# Patient Record
Sex: MALE | Race: WHITE | NOT HISPANIC OR LATINO | Employment: OTHER | URBAN - METROPOLITAN AREA
[De-identification: names, ages, dates, MRNs, and addresses within clinical notes are randomized per-mention and may not be internally consistent; named-entity substitution may affect disease eponyms.]

---

## 2020-08-11 RX ORDER — CALCIUM CARBONATE/VITAMIN D3 500-10/5ML
30 LIQUID (ML) ORAL
COMMUNITY

## 2020-08-11 RX ORDER — DIMENHYDRINATE 50 MG
TABLET ORAL DAILY
COMMUNITY

## 2020-08-11 RX ORDER — OMEPRAZOLE 20 MG/1
20 CAPSULE, DELAYED RELEASE ORAL DAILY
COMMUNITY

## 2020-08-11 RX ORDER — ATORVASTATIN CALCIUM 10 MG/1
10 TABLET, FILM COATED ORAL NIGHTLY
COMMUNITY

## 2020-08-11 NOTE — OR NURSING
"Preadmit appointment: \" Preparing for your Procedure information\" sheet given to patient with verbal and written instructions. Patient instructed to continue prescribed medications through the day before surgery, instructed to take the following medications the day of surgery per anesthesia protocol: omeprazole, atorvastatin.     Patient was advised to check with MD regarding Plan to stop and restart ASA.     Patient coming in to [facility] for testing on 8/13/20. Needs STAT Covid and EKG.  "

## 2020-08-13 ENCOUNTER — ANESTHESIA EVENT (OUTPATIENT)
Dept: SURGERY | Facility: MEDICAL CENTER | Age: 70
End: 2020-08-13
Payer: COMMERCIAL

## 2020-08-13 DIAGNOSIS — Z01.812 PRE-OPERATIVE LABORATORY EXAMINATION: ICD-10-CM

## 2020-08-13 DIAGNOSIS — Z01.810 PRE-OPERATIVE CARDIOVASCULAR EXAMINATION: ICD-10-CM

## 2020-08-13 LAB
COVID ORDER STATUS COVID19: NORMAL
EKG IMPRESSION: NORMAL
SARS-COV-2 RDRP RESP QL NAA+PROBE: NOTDETECTED
SPECIMEN SOURCE: NORMAL

## 2020-08-13 PROCEDURE — 93010 ELECTROCARDIOGRAM REPORT: CPT | Performed by: INTERNAL MEDICINE

## 2020-08-13 PROCEDURE — 93005 ELECTROCARDIOGRAM TRACING: CPT

## 2020-08-13 PROCEDURE — U0004 COV-19 TEST NON-CDC HGH THRU: HCPCS

## 2020-08-14 ENCOUNTER — APPOINTMENT (OUTPATIENT)
Dept: RADIOLOGY | Facility: MEDICAL CENTER | Age: 70
End: 2020-08-14
Attending: INTERNAL MEDICINE
Payer: COMMERCIAL

## 2020-08-14 ENCOUNTER — HOSPITAL ENCOUNTER (OUTPATIENT)
Facility: MEDICAL CENTER | Age: 70
End: 2020-08-14
Attending: INTERNAL MEDICINE | Admitting: INTERNAL MEDICINE
Payer: COMMERCIAL

## 2020-08-14 ENCOUNTER — ANESTHESIA (OUTPATIENT)
Dept: SURGERY | Facility: MEDICAL CENTER | Age: 70
End: 2020-08-14
Payer: COMMERCIAL

## 2020-08-14 VITALS
OXYGEN SATURATION: 94 % | TEMPERATURE: 97.3 F | RESPIRATION RATE: 16 BRPM | HEART RATE: 71 BPM | WEIGHT: 192.46 LBS | DIASTOLIC BLOOD PRESSURE: 85 MMHG | SYSTOLIC BLOOD PRESSURE: 132 MMHG

## 2020-08-14 LAB — PATHOLOGY CONSULT NOTE: NORMAL

## 2020-08-14 PROCEDURE — A9270 NON-COVERED ITEM OR SERVICE: HCPCS | Performed by: INTERNAL MEDICINE

## 2020-08-14 PROCEDURE — 160203 HCHG ENDO MINUTES - 1ST 30 MINS LEVEL 4: Performed by: INTERNAL MEDICINE

## 2020-08-14 PROCEDURE — 74330 X-RAY BILE/PANC ENDOSCOPY: CPT

## 2020-08-14 PROCEDURE — 700101 HCHG RX REV CODE 250: Performed by: INTERNAL MEDICINE

## 2020-08-14 PROCEDURE — 160035 HCHG PACU - 1ST 60 MINS PHASE I: Performed by: INTERNAL MEDICINE

## 2020-08-14 PROCEDURE — 500066 HCHG BITE BLOCK, ECT: Performed by: INTERNAL MEDICINE

## 2020-08-14 PROCEDURE — 160025 RECOVERY II MINUTES (STATS): Performed by: INTERNAL MEDICINE

## 2020-08-14 PROCEDURE — 502240 HCHG MISC OR SUPPLY RC 0272: Performed by: INTERNAL MEDICINE

## 2020-08-14 PROCEDURE — 700102 HCHG RX REV CODE 250 W/ 637 OVERRIDE(OP): Performed by: INTERNAL MEDICINE

## 2020-08-14 PROCEDURE — 88305 TISSUE EXAM BY PATHOLOGIST: CPT

## 2020-08-14 PROCEDURE — 700101 HCHG RX REV CODE 250: Performed by: ANESTHESIOLOGY

## 2020-08-14 PROCEDURE — 700111 HCHG RX REV CODE 636 W/ 250 OVERRIDE (IP): Performed by: ANESTHESIOLOGY

## 2020-08-14 PROCEDURE — 110371 HCHG SHELL REV 272: Performed by: INTERNAL MEDICINE

## 2020-08-14 PROCEDURE — 700105 HCHG RX REV CODE 258: Performed by: INTERNAL MEDICINE

## 2020-08-14 PROCEDURE — A9270 NON-COVERED ITEM OR SERVICE: HCPCS

## 2020-08-14 PROCEDURE — 160048 HCHG OR STATISTICAL LEVEL 1-5: Performed by: INTERNAL MEDICINE

## 2020-08-14 PROCEDURE — 160002 HCHG RECOVERY MINUTES (STAT): Performed by: INTERNAL MEDICINE

## 2020-08-14 PROCEDURE — 700102 HCHG RX REV CODE 250 W/ 637 OVERRIDE(OP)

## 2020-08-14 PROCEDURE — 160208 HCHG ENDO MINUTES - EA ADDL 1 MIN LEVEL 4: Performed by: INTERNAL MEDICINE

## 2020-08-14 PROCEDURE — 160046 HCHG PACU - 1ST 60 MINS PHASE II: Performed by: INTERNAL MEDICINE

## 2020-08-14 PROCEDURE — 160009 HCHG ANES TIME/MIN: Performed by: INTERNAL MEDICINE

## 2020-08-14 RX ORDER — INDOMETHACIN 50 MG/1
SUPPOSITORY RECTAL
Status: COMPLETED
Start: 2020-08-14 | End: 2020-08-14

## 2020-08-14 RX ORDER — HALOPERIDOL 5 MG/ML
1 INJECTION INTRAMUSCULAR
Status: DISCONTINUED | OUTPATIENT
Start: 2020-08-14 | End: 2020-08-14 | Stop reason: HOSPADM

## 2020-08-14 RX ORDER — INDOMETHACIN 50 MG/1
100 SUPPOSITORY RECTAL ONCE
Status: COMPLETED | OUTPATIENT
Start: 2020-08-14 | End: 2020-08-14

## 2020-08-14 RX ORDER — DIPHENHYDRAMINE HYDROCHLORIDE 50 MG/ML
12.5 INJECTION INTRAMUSCULAR; INTRAVENOUS
Status: DISCONTINUED | OUTPATIENT
Start: 2020-08-14 | End: 2020-08-14 | Stop reason: HOSPADM

## 2020-08-14 RX ORDER — LIDOCAINE HYDROCHLORIDE 20 MG/ML
INJECTION, SOLUTION EPIDURAL; INFILTRATION; INTRACAUDAL; PERINEURAL PRN
Status: DISCONTINUED | OUTPATIENT
Start: 2020-08-14 | End: 2020-08-14 | Stop reason: SURG

## 2020-08-14 RX ORDER — ONDANSETRON 2 MG/ML
4 INJECTION INTRAMUSCULAR; INTRAVENOUS
Status: DISCONTINUED | OUTPATIENT
Start: 2020-08-14 | End: 2020-08-14 | Stop reason: HOSPADM

## 2020-08-14 RX ORDER — SODIUM CHLORIDE, SODIUM LACTATE, POTASSIUM CHLORIDE, CALCIUM CHLORIDE 600; 310; 30; 20 MG/100ML; MG/100ML; MG/100ML; MG/100ML
INJECTION, SOLUTION INTRAVENOUS CONTINUOUS
Status: DISCONTINUED | OUTPATIENT
Start: 2020-08-14 | End: 2020-08-14 | Stop reason: HOSPADM

## 2020-08-14 RX ORDER — SUCCINYLCHOLINE/SOD CL,ISO/PF 200MG/10ML
SYRINGE (ML) INTRAVENOUS PRN
Status: DISCONTINUED | OUTPATIENT
Start: 2020-08-14 | End: 2020-08-14 | Stop reason: SURG

## 2020-08-14 RX ORDER — HYDRALAZINE HYDROCHLORIDE 20 MG/ML
5 INJECTION INTRAMUSCULAR; INTRAVENOUS
Status: DISCONTINUED | OUTPATIENT
Start: 2020-08-14 | End: 2020-08-14 | Stop reason: HOSPADM

## 2020-08-14 RX ORDER — ROCURONIUM BROMIDE 10 MG/ML
INJECTION, SOLUTION INTRAVENOUS PRN
Status: DISCONTINUED | OUTPATIENT
Start: 2020-08-14 | End: 2020-08-14 | Stop reason: SURG

## 2020-08-14 RX ADMIN — PROPOFOL 200 MG: 10 INJECTION, EMULSION INTRAVENOUS at 08:01

## 2020-08-14 RX ADMIN — INDOMETHACIN 100 MG: 50 SUPPOSITORY RECTAL at 09:13

## 2020-08-14 RX ADMIN — FENTANYL CITRATE 50 MCG: 50 INJECTION, SOLUTION INTRAMUSCULAR; INTRAVENOUS at 07:59

## 2020-08-14 RX ADMIN — SODIUM CHLORIDE, POTASSIUM CHLORIDE, SODIUM LACTATE AND CALCIUM CHLORIDE: 600; 310; 30; 20 INJECTION, SOLUTION INTRAVENOUS at 07:13

## 2020-08-14 RX ADMIN — LIDOCAINE HYDROCHLORIDE 0.5 ML: 10 INJECTION, SOLUTION INFILTRATION; PERINEURAL at 07:12

## 2020-08-14 RX ADMIN — Medication 100 MG: at 08:02

## 2020-08-14 RX ADMIN — LIDOCAINE HYDROCHLORIDE 100 MG: 20 INJECTION, SOLUTION EPIDURAL; INFILTRATION; INTRACAUDAL; PERINEURAL at 08:01

## 2020-08-14 RX ADMIN — ROCURONIUM BROMIDE 10 MG: 10 INJECTION, SOLUTION INTRAVENOUS at 08:01

## 2020-08-14 RX ADMIN — POVIDONE-IODINE 15 ML: 10 SOLUTION TOPICAL at 07:14

## 2020-08-14 ASSESSMENT — PAIN SCALES - GENERAL: PAIN_LEVEL: 0

## 2020-08-14 NOTE — ANESTHESIA PROCEDURE NOTES
Airway    Date/Time: 8/14/2020 8:03 AM  Performed by: Efrain Omer M.D.  Authorized by: Efrain Omer M.D.     Location:  OR  Urgency:  Elective  Difficult Airway: No    Indications for Airway Management:  Anesthesia and airway protection      Spontaneous Ventilation: absent    Sedation Level:  Deep  Preoxygenated: Yes    Patient Position:  Sniffing  MILS Maintained Throughout: No    Mask Difficulty Assessment:  0 - not attempted  Final Airway Type:  Endotracheal airway  Final Endotracheal Airway:  ETT  Cuffed: Yes    Technique Used for Successful ETT Placement:  Video laryngoscopy    Insertion Site:  Oral  Blade Type:  Glide  Laryngoscope Blade/Videolaryngoscope Blade Size:  4  ETT Size (mm):  7.5  Measured from:  Teeth  ETT to Teeth (cm):  23  Placement Verified by: auscultation and capnometry    Cormack-Lehane Classification:  Grade I - full view of glottis  Number of Attempts at Approach:  1  Number of Other Approaches Attempted:  0

## 2020-08-14 NOTE — ANESTHESIA PREPROCEDURE EVALUATION
"Anes H&P:  PAST MEDICAL HISTORY:   69 y.o. male who presents for Procedure(s):  GASTROSCOPY  EGD, WITH ENDOSCOPIC US - UPPER LINEAR  ERCP (ENDOSCOPIC RETROGRADE CHOLANGIOPANCREATOGRAPHY) - WITH SPHINCTEROTOMY WITH STONE REMOVAL POSSIBLE STENT PLACEMENT.  He has current and past medical problems significant for:    #Gallstone, no nausea or vomiting    Patient denies history of seizures or strokes  Patient denies history of diabetes or thyroid disease  Patient denies history of esophageal disease  Patient denies history of ALEXA  Patient denies history of previous MI, chest pain or shortness of breath  Patient denies history of renal failure  Patient denies history of upper or lower extremity motor/sensory deficits   Patient denies history of bleeding disorders    Able to climb 2 flights of stair without dyspnea or angina, > 4 METs     Past Medical History:   Diagnosis Date   • Anesthesia     combative under anesthesia \"I was swinging\"    • Arrhythmia     history of - asymptomatic   • Cataract     iol bilateral   • Heart burn    • High cholesterol    • Urinary incontinence        SMOKING/ALCOHOL/RECREATIONAL DRUG USE:  Social History     Tobacco Use   • Smoking status: Never Smoker   • Smokeless tobacco: Never Used   Substance Use Topics   • Alcohol use: Yes     Comment: 6-8/day   • Drug use: Never     Social History     Substance and Sexual Activity   Drug Use Never       PAST SURGICAL HISTORY:  Past Surgical History:   Procedure Laterality Date   • CATARACT EXTRACTION WITH IOL  2019   • SHOULDER ARTHROSCOPY W/ ROTATOR CUFF REPAIR Left 11/2018       ALLERGIES:   No Known Allergies    MEDICATIONS:  No current facility-administered medications on file prior to encounter.      No current outpatient medications on file prior to encounter.       LABS:  No results found for: HEMOGLOBIN, HEMATOCRIT, WBC  No results found for: SODIUM, POTASSIUM, CHLORIDE, CO2, GLUCOSE, BUN, CALCIUM    SARS-CoV2 result: Negative    EKG 8/13/20 " NSR, LAFB    PREVIOUS ANESTHETICS: See EMR  __________________________________________      Relevant Problems   No relevant active problems       Physical Exam    Airway   Mallampati: III  TM distance: >3 FB  Neck ROM: full       Cardiovascular - normal exam  Rhythm: regular  Rate: normal  (-) murmur     Dental - normal exam           Pulmonary - normal exam  Breath sounds clear to auscultation     Abdominal    Neurological - normal exam                 Anesthesia Plan    ASA 2       Plan - general       Airway plan will be ETT      Plan Factors:   Patient was not previously instructed to abstain from smoking on day of procedure.  Patient did not smoke on day of procedure.      Induction: intravenous and rapid sequence    Postoperative Plan: Postoperative administration of opioids is intended.    Pertinent diagnostic labs and testing reviewed    Informed Consent:    Anesthetic plan and risks discussed with patient.    Use of blood products discussed with: patient whom consented to blood products.

## 2020-08-14 NOTE — ANESTHESIA QCDR
2019 [facility] Clinical Data Registry (for Quality Improvement)     Postoperative nausea/vomiting risk protocol (Adult = 18 yrs and Pediatric 3-17 yrs)- (430 and 463)  General inhalation anesthetic (NOT TIVA) with PONV risk factors: No  Provision of anti-emetic therapy with at least 2 different classes of agents: N/A  Patient DID NOT receive anti-emetic therapy and reason is documented in Medical Record: N/A    Multimodal Pain Management- (477)  Non-emergent surgery AND patient age >= 18: No  Use of Multimodal Pain Management, two or more drugs and/or interventions, NOT including systemic opioids:   Exception: Documented allergy to multiple classes of analgesics:     Smoking Abstinence (404)  Patient is current smoker (cigarette, pipe, e-cig, marijuanna): No  Elective Surgery:   Abstinence instructions provided prior to day of surgery:   Patient abstained from smoking on day of surgery:     Pre-Op Beta-Blocker in Isolated CABG (44)  Isolated CABG AND patient age >= 18: No  Beta-blocker admin within 24 hours of surgical incision:   Exception:of medical reason(s) for not administering beta blocker within 24 hours prior to surgical incision (e.g., not  indicated,other medical reason):     PACU assessment of acute postoperative pain prior to Anesthesia Care End- Applies to Patients Age = 18- (ABG7)  Initial PACU pain score is which of the following: < 7/10  Patient unable to report pain score: N/A    Post-anesthetic transfer of care checklist/protocol to PACU/ICU- (426 and 427)  Upon conclusion of case, patient transferred to which of the following locations: PACU/Non-ICU  Use of transfer checklist/protocol: Yes  Exclusion: Service Performed in Patient Hospital Room (and thus did not require transfer): N/A  Unplanned admission to ICU related to anesthesia service up through end of PACU care- (MD51)  Unplanned admission to ICU (not initially anticipated at anesthesia start time): No

## 2020-08-14 NOTE — OR NURSING
0900 Pt arrived from OR post   GASTROSCOPY     EGD, WITH ENDOSCOPIC US - UPPER LINEAR     ERCP (ENDOSCOPIC RETROGRADE CHOLANGIOPANCREATOGRAPHY) - WITH SPHINCTEROTOMY WITH STONE REMOVAL POSSIBLE STENT PLACEMENT(((not done)))     , report received. Pt breathing unlabored with clear lung sounds bilat.    0933 Pt states pain is controled and tolerable, denies nausea. Pt tolerating PO fluids. Pt to phase II

## 2020-08-14 NOTE — ANESTHESIA POSTPROCEDURE EVALUATION
Patient: Tommy Carr    Procedure Summary     Date: 08/14/20 Room / Location:  ENDOSCOPIC ULTRASOUND ROOM / SURGERY [facility]    Anesthesia Start: 0758 Anesthesia Stop: 0905    Procedures:       GASTROSCOPY (Abdomen)      EGD, WITH ENDOSCOPIC US - UPPER LINEAR      ERCP (ENDOSCOPIC RETROGRADE CHOLANGIOPANCREATOGRAPHY) - WITH SPHINCTEROTOMY WITH STONE REMOVAL POSSIBLE STENT PLACEMENT(((not done))) Diagnosis: (CHOLEDOCHOLITHIASIS)    Surgeon: Jesus Aponte M.D. Responsible Provider: Efrain Omer M.D.    Anesthesia Type: general ASA Status: 2          Final Anesthesia Type: general  Last vitals  BP   Blood Pressure : 132/85    Temp   36.3 °C (97.3 °F)    Pulse   Pulse: 71   Resp   16    SpO2   94 %      Anesthesia Post Evaluation    Patient location during evaluation: PACU  Patient participation: complete - patient participated  Level of consciousness: awake and alert  Pain score: 0    Airway patency: patent  Anesthetic complications: no  Cardiovascular status: hemodynamically stable  Respiratory status: acceptable  Hydration status: euvolemic    PONV: none           Nurse Pain Score: 0 (NPRS)         1-2 cups/cans per day

## 2020-08-14 NOTE — PROGRESS NOTES
Indication: Abdominal pain, gallstones.   Risks, benefits, and alternatives were discussed with consenting person(s). Consenting person(s) were given an opportunity to ask questions and discuss other options. Risks including but not limited to failed or incomplete EGD/EUS/ERCP and possible stent placement, ineffective therapy, pancreatitis (with potential future complications), perforation, infection, bleeding, missed lesion(s), missed diagnosis, cardiac and/or pulmonary event, aspiration, stroke, possible need for surgery, hospitalization possibly prolonged, discomfort, unsuccessful and/or incomplete procedure, possible need for repeat procedures and/or additional testings, damage to adjacent organs and/or vascular structures, medication reaction, disability, death, and other adverse events possibly life-threatening. Discussion was undertaken with Layman's terms. Consenting persons stated understanding and acceptance of these risks, and wished to proceed. Consent was given in clear state of mind. Discussed need for returning for stent removal if placed.

## 2020-08-14 NOTE — OR NURSING
"0933: Pt arrived from PACU/post endo procedure, Rn report, Pt settled into recliner/dressed, States \"feels a little groggy but pretty good,\" Denies pain/nausea  0943: Cont to have no pain/nausea, VSS  0957: IV removed, Dc instructions completed with pt and friend over the phone, Verbalized understanding, Cont to have no pain  "

## 2020-08-14 NOTE — ANESTHESIA TIME REPORT
Anesthesia Start and Stop Event Times     Date Time Event    8/14/2020 0740 Ready for Procedure     0758 Anesthesia Start     0905 Anesthesia Stop        Responsible Staff  08/14/20    Name Role Begin End    Efrain Omer M.D. Anesth 0758 0905    Jimenez Powers M.D. Anesth 0758 0905        Preop Diagnosis (Free Text):  Pre-op Diagnosis     CHOLEDOCHOLITHIASIS        Preop Diagnosis (Codes):    Post op Diagnosis  Choledocholithiasis      Premium Reason  Non-Premium    Comments: EGD, EUS, ERCP, sphincterotomy, balloon sweep

## 2020-08-14 NOTE — DISCHARGE INSTRUCTIONS
ENDOSCOPY HOME CARE INSTRUCTIONS    GASTROSCOPY OR ERCP  1. Don't eat or drink anything for about an hour after the test. You can then resume your regular diet.  2. Don't drive or drink alcohol for 24 hours. The medication you received will make you too drowsy.  3. Don't take any coffee, tea, or aspirin products until after you see your doctor. These can harm the lining of your stomach.  4. If you begin to vomit bloody material, or develop black or bloody stools, call your doctor as soon as possible.  5. If you have any neck, chest, abdominal pain or temp of 100 degrees, call your doctor.    Recommendations:   1.  Advance diet gradually  2.  Surgical consultation for cholecystectomy would possible intraoperative cholangiogram   3.  Would recommend repeat MRCP if change in symptoms as no stones were seen. It possible that these stones have passed in the interim since the images from 6/29/2020    DR. HAYES [phone]    Depression / Suicide Risk    [facility], it is important to learn how to keep safe from harming yourself.    Recognize the warning signs:  · Abrupt changes in personality, positive or negative- including increase in energy   · Giving away possessions  · Change in eating patterns- significant weight changes-  positive or negative  · Change in sleeping patterns- unable to sleep or sleeping all the time   · Unwillingness or inability to communicate  · Depression  · Unusual sadness, discouragement and loneliness  · Talk of wanting to die  · Neglect of personal appearance   · Rebelliousness- reckless behavior  · Withdrawal from people/activities they love  · Confusion- inability to concentrate     If you or a loved one observes any of these behaviors or has concerns about self-harm, here's what you can do:  · Talk about it- your feelings and reasons for harming yourself  · Remove any means that you might use to hurt yourself (examples: pills, rope, extension  cords, firearm)  · Get professional help from the community (Mental Health, Substance Abuse, psychological counseling)  · Do not be alone:Call your Safe Contact- someone whom you trust who will be there for you.  · Call your local CRISIS HOTLINE 284-2731 or 242-189-5352  · Call your local Children's Mobile Crisis Response Team [place] (416) 848-1859 or www.Minglebox  · Call the toll free National Suicide Prevention Hotlines   · National Suicide Prevention Lifeline 081-016-ONUN (7897)  · National Hope Line Network 800-SUICIDE (345-1845)    I acknowledge receipt and understanding of these Home Care Instructions.

## 2020-08-14 NOTE — PROCEDURES
Esophagogastroduodenoscopy (EGD)/ Echoendoscope (EUS)/ Endoscopic Retrograde Cholangiopancreatography (ERCP)  Attending Physician: Jesus Aponte MD    Indications: Abnormal MRCP  Instrument: Olympus Endoscope/ Echoendoscope/ Duodenoscope  Sedation:     Anesthesiologist/Type of Anesthesia:  Anesthesiologist: Efrain Omer M.D.; Jimenez Powers M.D./General    Surgical Staff:  Circulator: Michael Linton R.N.  Endoscopy Technician: Gabriella Fletcher; Jayna Rodriguez  Radiology Technologist: Elsi El    Specimens removed if any:  ID Type Source Tests Collected by Time Destination   A : Biopsy Other Other PATHOLOGY SPECIMEN Jesus Aponte M.D. 8/14/2020  8:16 AM    B : Biopsy Other Other PATHOLOGY SPECIMEN Jesus Aponte M.D. 8/14/2020  8:16 AM          Pre-Anesthesia Assessment:  Prior to the procedure, a History and Physical was performed, and patient medications and allergies were reviewed. The patient’s tolerance of previous anesthesia was also reviewed. The risks and benefits of the procedure and the sedation options and risks were discussed with the patient including but not limited to infection, bleeding, aspiration, perforation, adverse medication reaction, missed diagnosis,  pancreatitis, and missed lesions. The patient verbalized understanding. All questions were answered, and informed consent was obtained.     After I obtained informed consent from the patient, the patient was placed in the swimmer  position. Appropriate time-out protocol was followed: the correct patient, the correct procedure, and the correct equipment in the room were confirmed. Throughout the procedure, the patient’s blood pressure, pulse, and oxygen saturations were monitored continuously. The endoscope/echoendoscope/duodenoscope were inserted through the oropharynx, esophagus intubated, then advanced to the gastrointestinal tract.  Findings and interventions were performed and documented below. Air was then withdrawn and the  endoscopes removed. The patient tolerated the procedure well. There were no immediate postoperative complications.       Findings:    EGD:  Esophagus:Normal. Irregular Z-line at 43cm from incisors, 4 quadrant biopsies.  Stomach: Within the limits of the procedure from in position the stomach overall was without gross mass or ulceration.  There were some fundic appearing polyps that was sampled were all less than 5mm in size.  Duodenum: First and second portion duodenum appeared normal.     EUS:  Endoscopic exam with the side viewing echoendoscope was normal. The major papilla was normal endoscopically and sonographically.    The bile duct was dilated and measured 14 mm in maximal diameter. The gallbladder was abnormal with sludge. No large stones were noted. No obvious stones were seen despite multiple passes. Transmission quality limited, secondary to large duodenal diverticulum. The appeared to be a taper of the bile duct into the duodenum like the intrapancreatic portion.  No obvious mass was seen.    The pancreatic parenchyma appeared abnormal. There were features of chronic pancreatitis although based on [place] criteria (hyperechoic strands, lobularly) does not qualify for chronic pancreatitis). No masses, cysts or stones were visualized in the pancreatic parenchyma.The pancreatic duct measured  3.8mm in the head, 2mm in the body of the pancreas.    No lymph nodes were seen in the upper abdomen and mediastinum.    No masses were seen in the visualized portions of the liver.    The left adrenal appeared normal.    ERCP:   film was normal.  Scope was inserted to the second portion of duodenum without difficulty.  There appears to be a large duodenal diverticulum proximal of the ampulla.  At the location the ampulla there also appears to be a small duodenal diverticulum of which the papilla was at the 7 o'clock position at the edge of the diverticulum.  Angulation with a 0.025 wire sphincterotome was pure.   Wire was introduced into the right intrahepatic duct.  Test cholangiogram demonstrated appropriate anatomy with dilated common bile duct.  Rising initially in 9-12 mm extractor balloon multiple balloon sweeps were performed with only sludge noted.  No obvious stones noted.  This was subsequently changed to a 15-18mm extractor balloon with gradual decrease of balloon on sweep through the ampulla.  Subsequently a stepwise occlusion cholangiogram demonstrated no filling defect of any sorts noted despite sweeps from the intrahepatic portion of the duct (see fluoro view).  Impacted occlusion cholangiogram also demonstrated no extravasation of contrast bilateral films under diaphragm was negative for free air.  Pancreatic was never cannulated or injected throughout entire procedure.          NOTE: Radiologic interpretation of dynamic and static fluoroscopic imaging by myself.  At no time was/were a Radiologist present.       Impressions:   1. Dilated common bile duct and intraheptic duct without obvious stones noted, treated with ERCP, biliary sphincterotomy, balloon sweep.   2. Possible dilation of the bile duct secondary to anatomy of papilla adjacent to small diverticulum and courses through large duodenal diverticulum  3. Gastric polyp, likely fundic gland in nature, sample biopsied.  4. Irregular Z-line at 43cm, 4 quadrant biopsies to rule out Calabrese's esophagus.     Recommendations:   1.  Monitor for postprocedure complication including perforation bleeding pancreatitis  2.  Advance diet gradually  3.  Surgical consultation for cholecystectomy would possible intraoperative cholangiogram   4.  Would recommend repeat MRCP if change in symptoms as no stones were seen. It possible that these stones have passed in the interim since the images from 6/29/2020        This note was generated using voice recognition software which has a small chance of producing errors of grammar and possibly content. I have made every  reasonable attempt to find and correct any obvious errors, but expect that some may not be found prior to finalization of this note

## 2020-08-14 NOTE — OR NURSING
Patient to preop, allergies and NPO status verified, home medications reconciled, belongings secured, verbalizes understanding of pain scale, surgical site verified, IV access established, SCDs in place, nasal and oral of triple aim completed.

## 2025-07-18 ENCOUNTER — HOSPITAL ENCOUNTER (INPATIENT)
Facility: MEDICAL CENTER | Age: 75
LOS: 3 days | DRG: 322 | End: 2025-07-21
Attending: STUDENT IN AN ORGANIZED HEALTH CARE EDUCATION/TRAINING PROGRAM | Admitting: STUDENT IN AN ORGANIZED HEALTH CARE EDUCATION/TRAINING PROGRAM
Payer: COMMERCIAL

## 2025-07-18 ENCOUNTER — HOSPITAL ENCOUNTER (OUTPATIENT)
Dept: RADIOLOGY | Facility: MEDICAL CENTER | Age: 75
End: 2025-07-18

## 2025-07-18 DIAGNOSIS — I50.20 HFREF (HEART FAILURE WITH REDUCED EJECTION FRACTION) (HCC): ICD-10-CM

## 2025-07-18 DIAGNOSIS — I21.4 NSTEMI (NON-ST ELEVATED MYOCARDIAL INFARCTION) (HCC): Primary | ICD-10-CM

## 2025-07-18 PROBLEM — E78.49 OTHER HYPERLIPIDEMIA: Status: ACTIVE | Noted: 2025-07-18

## 2025-07-18 PROBLEM — K21.9 GERD (GASTROESOPHAGEAL REFLUX DISEASE): Status: ACTIVE | Noted: 2025-07-18

## 2025-07-18 LAB
ALBUMIN SERPL BCP-MCNC: 4.2 G/DL (ref 3.2–4.9)
ALBUMIN/GLOB SERPL: 1.8 G/DL
ALP SERPL-CCNC: 57 U/L (ref 30–99)
ALT SERPL-CCNC: 15 U/L (ref 2–50)
ANION GAP SERPL CALC-SCNC: 10 MMOL/L (ref 7–16)
APTT PPP: 27.9 SEC (ref 24.7–36)
AST SERPL-CCNC: 22 U/L (ref 12–45)
BILIRUB SERPL-MCNC: 0.7 MG/DL (ref 0.1–1.5)
BUN SERPL-MCNC: 11 MG/DL (ref 8–22)
CALCIUM ALBUM COR SERPL-MCNC: 8.7 MG/DL (ref 8.5–10.5)
CALCIUM SERPL-MCNC: 8.9 MG/DL (ref 8.5–10.5)
CHLORIDE SERPL-SCNC: 104 MMOL/L (ref 96–112)
CO2 SERPL-SCNC: 23 MMOL/L (ref 20–33)
CREAT SERPL-MCNC: 0.79 MG/DL (ref 0.5–1.4)
EKG IMPRESSION: NORMAL
ERYTHROCYTE [DISTWIDTH] IN BLOOD BY AUTOMATED COUNT: 44.3 FL (ref 35.9–50)
GFR SERPLBLD CREATININE-BSD FMLA CKD-EPI: 92 ML/MIN/1.73 M 2
GLOBULIN SER CALC-MCNC: 2.3 G/DL (ref 1.9–3.5)
GLUCOSE SERPL-MCNC: 137 MG/DL (ref 65–99)
HCT VFR BLD AUTO: 40.1 % (ref 42–52)
HGB BLD-MCNC: 13.6 G/DL (ref 14–18)
INR PPP: 1.03 (ref 0.87–1.13)
MAGNESIUM SERPL-MCNC: 2.3 MG/DL (ref 1.5–2.5)
MCH RBC QN AUTO: 31.3 PG (ref 27–33)
MCHC RBC AUTO-ENTMCNC: 33.9 G/DL (ref 32.3–36.5)
MCV RBC AUTO: 92.2 FL (ref 81.4–97.8)
PLATELET # BLD AUTO: 201 K/UL (ref 164–446)
PMV BLD AUTO: 8.8 FL (ref 9–12.9)
POTASSIUM SERPL-SCNC: 4.5 MMOL/L (ref 3.6–5.5)
PROT SERPL-MCNC: 6.5 G/DL (ref 6–8.2)
PROTHROMBIN TIME: 13.5 SEC (ref 12–14.6)
RBC # BLD AUTO: 4.35 M/UL (ref 4.7–6.1)
SODIUM SERPL-SCNC: 137 MMOL/L (ref 135–145)
TROPONIN T SERPL-MCNC: 41 NG/L (ref 6–19)
TROPONIN T SERPL-MCNC: 42 NG/L (ref 6–19)
UFH PPP CHRO-ACNC: 0.51 IU/ML
UFH PPP CHRO-ACNC: <0.1 IU/ML
WBC # BLD AUTO: 8.6 K/UL (ref 4.8–10.8)

## 2025-07-18 PROCEDURE — 85027 COMPLETE CBC AUTOMATED: CPT

## 2025-07-18 PROCEDURE — 700102 HCHG RX REV CODE 250 W/ 637 OVERRIDE(OP): Performed by: STUDENT IN AN ORGANIZED HEALTH CARE EDUCATION/TRAINING PROGRAM

## 2025-07-18 PROCEDURE — 85610 PROTHROMBIN TIME: CPT

## 2025-07-18 PROCEDURE — 700102 HCHG RX REV CODE 250 W/ 637 OVERRIDE(OP): Performed by: NURSE PRACTITIONER

## 2025-07-18 PROCEDURE — 85730 THROMBOPLASTIN TIME PARTIAL: CPT

## 2025-07-18 PROCEDURE — 80053 COMPREHEN METABOLIC PANEL: CPT

## 2025-07-18 PROCEDURE — A9270 NON-COVERED ITEM OR SERVICE: HCPCS | Performed by: NURSE PRACTITIONER

## 2025-07-18 PROCEDURE — 93005 ELECTROCARDIOGRAM TRACING: CPT | Mod: TC | Performed by: HOSPITALIST

## 2025-07-18 PROCEDURE — 770020 HCHG ROOM/CARE - TELE (206)

## 2025-07-18 PROCEDURE — 93010 ELECTROCARDIOGRAM REPORT: CPT | Performed by: STUDENT IN AN ORGANIZED HEALTH CARE EDUCATION/TRAINING PROGRAM

## 2025-07-18 PROCEDURE — 99291 CRITICAL CARE FIRST HOUR: CPT | Performed by: STUDENT IN AN ORGANIZED HEALTH CARE EDUCATION/TRAINING PROGRAM

## 2025-07-18 PROCEDURE — 85520 HEPARIN ASSAY: CPT | Mod: 91

## 2025-07-18 PROCEDURE — 36415 COLL VENOUS BLD VENIPUNCTURE: CPT

## 2025-07-18 PROCEDURE — 99222 1ST HOSP IP/OBS MODERATE 55: CPT | Performed by: INTERNAL MEDICINE

## 2025-07-18 PROCEDURE — 700111 HCHG RX REV CODE 636 W/ 250 OVERRIDE (IP): Performed by: STUDENT IN AN ORGANIZED HEALTH CARE EDUCATION/TRAINING PROGRAM

## 2025-07-18 PROCEDURE — 83735 ASSAY OF MAGNESIUM: CPT

## 2025-07-18 PROCEDURE — A9270 NON-COVERED ITEM OR SERVICE: HCPCS | Performed by: STUDENT IN AN ORGANIZED HEALTH CARE EDUCATION/TRAINING PROGRAM

## 2025-07-18 PROCEDURE — 84484 ASSAY OF TROPONIN QUANT: CPT

## 2025-07-18 RX ORDER — ATORVASTATIN CALCIUM 10 MG/1
10 TABLET, FILM COATED ORAL EVERY EVENING
Status: DISCONTINUED | OUTPATIENT
Start: 2025-07-18 | End: 2025-07-20

## 2025-07-18 RX ORDER — LABETALOL HYDROCHLORIDE 5 MG/ML
10 INJECTION, SOLUTION INTRAVENOUS EVERY 4 HOURS PRN
Status: DISCONTINUED | OUTPATIENT
Start: 2025-07-18 | End: 2025-07-21 | Stop reason: HOSPADM

## 2025-07-18 RX ORDER — HEPARIN SODIUM 5000 [USP'U]/100ML
0-30 INJECTION, SOLUTION INTRAVENOUS CONTINUOUS
Status: DISCONTINUED | OUTPATIENT
Start: 2025-07-18 | End: 2025-07-19

## 2025-07-18 RX ORDER — HEPARIN SODIUM 1000 [USP'U]/ML
4000 INJECTION, SOLUTION INTRAVENOUS; SUBCUTANEOUS ONCE
Status: COMPLETED | OUTPATIENT
Start: 2025-07-18 | End: 2025-07-18

## 2025-07-18 RX ORDER — CALCIUM CARBONATE 500 MG/1
500 TABLET, CHEWABLE ORAL 3 TIMES DAILY PRN
Status: DISCONTINUED | OUTPATIENT
Start: 2025-07-18 | End: 2025-07-21 | Stop reason: HOSPADM

## 2025-07-18 RX ORDER — HEPARIN SODIUM 1000 [USP'U]/ML
2000 INJECTION, SOLUTION INTRAVENOUS; SUBCUTANEOUS PRN
Status: DISCONTINUED | OUTPATIENT
Start: 2025-07-18 | End: 2025-07-19

## 2025-07-18 RX ORDER — ACETAMINOPHEN 325 MG/1
650 TABLET ORAL EVERY 6 HOURS PRN
Status: DISCONTINUED | OUTPATIENT
Start: 2025-07-18 | End: 2025-07-21 | Stop reason: HOSPADM

## 2025-07-18 RX ADMIN — HEPARIN SODIUM 4000 UNITS: 1000 INJECTION, SOLUTION INTRAVENOUS; SUBCUTANEOUS at 12:00

## 2025-07-18 RX ADMIN — HEPARIN SODIUM 12 UNITS/KG/HR: 5000 INJECTION, SOLUTION INTRAVENOUS at 12:00

## 2025-07-18 RX ADMIN — ATORVASTATIN CALCIUM 10 MG: 10 TABLET, FILM COATED ORAL at 18:20

## 2025-07-18 RX ADMIN — ANTACID TABLETS 500 MG: 500 TABLET, CHEWABLE ORAL at 20:43

## 2025-07-18 SDOH — ECONOMIC STABILITY: TRANSPORTATION INSECURITY
IN THE PAST 12 MONTHS, HAS THE LACK OF TRANSPORTATION KEPT YOU FROM MEDICAL APPOINTMENTS OR FROM GETTING MEDICATIONS?: NO

## 2025-07-18 SDOH — ECONOMIC STABILITY: TRANSPORTATION INSECURITY
IN THE PAST 12 MONTHS, HAS LACK OF RELIABLE TRANSPORTATION KEPT YOU FROM MEDICAL APPOINTMENTS, MEETINGS, WORK OR FROM GETTING THINGS NEEDED FOR DAILY LIVING?: NO

## 2025-07-18 ASSESSMENT — ENCOUNTER SYMPTOMS
NAUSEA: 0
DIZZINESS: 1
WEAKNESS: 0
VOMITING: 0
FEVER: 0
EYES NEGATIVE: 1
NERVOUS/ANXIOUS: 0
MUSCULOSKELETAL NEGATIVE: 1
BRUISES/BLEEDS EASILY: 0
MYALGIAS: 0
GASTROINTESTINAL NEGATIVE: 1
CHILLS: 0
ABDOMINAL PAIN: 0
PSYCHIATRIC NEGATIVE: 1
COUGH: 0
CONSTITUTIONAL NEGATIVE: 1
SHORTNESS OF BREATH: 0
RESPIRATORY NEGATIVE: 1
HEADACHES: 1
CARDIOVASCULAR NEGATIVE: 1
PALPITATIONS: 0

## 2025-07-18 ASSESSMENT — SOCIAL DETERMINANTS OF HEALTH (SDOH)
IN THE PAST 12 MONTHS, HAS THE ELECTRIC, GAS, OIL, OR WATER COMPANY THREATENED TO SHUT OFF SERVICE IN YOUR HOME?: NO
WITHIN THE LAST YEAR, HAVE YOU BEEN AFRAID OF YOUR PARTNER OR EX-PARTNER?: NO
WITHIN THE LAST YEAR, HAVE TO BEEN RAPED OR FORCED TO HAVE ANY KIND OF SEXUAL ACTIVITY BY YOUR PARTNER OR EX-PARTNER?: NO
WITHIN THE PAST 12 MONTHS, YOU WORRIED THAT YOUR FOOD WOULD RUN OUT BEFORE YOU GOT THE MONEY TO BUY MORE: NEVER TRUE
WITHIN THE LAST YEAR, HAVE YOU BEEN HUMILIATED OR EMOTIONALLY ABUSED IN OTHER WAYS BY YOUR PARTNER OR EX-PARTNER?: NO
WITHIN THE PAST 12 MONTHS, THE FOOD YOU BOUGHT JUST DIDN'T LAST AND YOU DIDN'T HAVE MONEY TO GET MORE: NEVER TRUE
WITHIN THE LAST YEAR, HAVE YOU BEEN AFRAID OF YOUR PARTNER OR EX-PARTNER?: NO
WITHIN THE LAST YEAR, HAVE YOU BEEN KICKED, HIT, SLAPPED, OR OTHERWISE PHYSICALLY HURT BY YOUR PARTNER OR EX-PARTNER?: NO
WITHIN THE LAST YEAR, HAVE YOU BEEN HUMILIATED OR EMOTIONALLY ABUSED IN OTHER WAYS BY YOUR PARTNER OR EX-PARTNER?: NO
WITHIN THE LAST YEAR, HAVE TO BEEN RAPED OR FORCED TO HAVE ANY KIND OF SEXUAL ACTIVITY BY YOUR PARTNER OR EX-PARTNER?: NO

## 2025-07-18 ASSESSMENT — PATIENT HEALTH QUESTIONNAIRE - PHQ9
4. FEELING TIRED OR HAVING LITTLE ENERGY: NEARLY EVERY DAY
7. TROUBLE CONCENTRATING ON THINGS, SUCH AS READING THE NEWSPAPER OR WATCHING TELEVISION: NEARLY EVERY DAY
5. POOR APPETITE OR OVEREATING: NOT AT ALL
2. FEELING DOWN, DEPRESSED, IRRITABLE, OR HOPELESS: NOT AT ALL
8. MOVING OR SPEAKING SO SLOWLY THAT OTHER PEOPLE COULD HAVE NOTICED. OR THE OPPOSITE, BEING SO FIGETY OR RESTLESS THAT YOU HAVE BEEN MOVING AROUND A LOT MORE THAN USUAL: NOT AT ALL
3. TROUBLE FALLING OR STAYING ASLEEP OR SLEEPING TOO MUCH: SEVERAL DAYS
SUM OF ALL RESPONSES TO PHQ9 QUESTIONS 1 AND 2: 0
1. LITTLE INTEREST OR PLEASURE IN DOING THINGS: NOT AT ALL
SUM OF ALL RESPONSES TO PHQ9 QUESTIONS 1 AND 2: 1
6. FEELING BAD ABOUT YOURSELF - OR THAT YOU ARE A FAILURE OR HAVE LET YOURSELF OR YOUR FAMILY DOWN: NOT AL ALL
1. LITTLE INTEREST OR PLEASURE IN DOING THINGS: NOT AT ALL
2. FEELING DOWN, DEPRESSED, IRRITABLE, OR HOPELESS: SEVERAL DAYS
SUM OF ALL RESPONSES TO PHQ QUESTIONS 1-9: 8
9. THOUGHTS THAT YOU WOULD BE BETTER OFF DEAD, OR OF HURTING YOURSELF: NOT AT ALL

## 2025-07-18 ASSESSMENT — COGNITIVE AND FUNCTIONAL STATUS - GENERAL
SUGGESTED CMS G CODE MODIFIER DAILY ACTIVITY: CH
DAILY ACTIVITIY SCORE: 24
MOBILITY SCORE: 24
SUGGESTED CMS G CODE MODIFIER MOBILITY: CH

## 2025-07-18 ASSESSMENT — LIFESTYLE VARIABLES
DOES PATIENT WANT TO STOP DRINKING: YES
TOTAL SCORE: 2
HAVE YOU EVER FELT YOU SHOULD CUT DOWN ON YOUR DRINKING: YES
TOTAL SCORE: 2
DOES PATIENT WANT TO TALK TO SOMEONE ABOUT QUITTING: NO
CONSUMPTION TOTAL: POSITIVE
HAVE PEOPLE ANNOYED YOU BY CRITICIZING YOUR DRINKING: NO
EVER HAD A DRINK FIRST THING IN THE MORNING TO STEADY YOUR NERVES TO GET RID OF A HANGOVER: NO
ALCOHOL_USE: YES
TOTAL SCORE: 2
EVER FELT BAD OR GUILTY ABOUT YOUR DRINKING: YES
HOW MANY TIMES IN THE PAST YEAR HAVE YOU HAD 5 OR MORE DRINKS IN A DAY: 100
AVERAGE NUMBER OF DAYS PER WEEK YOU HAVE A DRINK CONTAINING ALCOHOL: 7
ON A TYPICAL DAY WHEN YOU DRINK ALCOHOL HOW MANY DRINKS DO YOU HAVE: 5

## 2025-07-18 NOTE — HOSPITAL COURSE
Tommy Carr is a 74-year-old male with PMHx HDL and GERD.  Admitted 7/18 for presyncope and diaphoresis.    Prior history: Patient was working out in the heat yesterday.  He came into his kitchen where he began to feel lightheaded and confused.  This was immediately followed by severe diaphoresis and epigastric pain.  He was brought to the emergency room.    At OSH-patient was noted to have rising troponins.  EKG without ST depression or elevation consistent with ischemia.  Cardiology was consulted from OSH and recommended [facility] for formal consultation.    Patient underwent stress test which showed a large fixed myocardial perfusion defect in the cardiac apex and extending to distal aspects of anterior, septal and inferior walls.  Echocardiogram showing moderately reduced ejection fraction at 46%.  Hypokinesis of apical septum.    Cardiology recommending left heart catheterization which was completed on 7/20.  PCI to proximal LAD and mid LAD with angioplasty of ramus intermedius branch.  He did not have any post catheterization complications.    Patient was started on gentle GDMT with metoprolol and losartan.  He has low to normal blood pressures.  He will follow-up with cardiology in the next 1 week.  He will continue DAPT therapy for the next 12 months.

## 2025-07-18 NOTE — ASSESSMENT & PLAN NOTE
Presenting with rising troponins at OSH  No chest pain; diaphoresis and epigastric pain noted  EKG without ST elevation or depression consistent with ischemia  Troponin here 41, 42, 43  Stress test:  large fixed myocardial perfusion defect in apex and extending to distal anterior, septal and inferior walls.  Echo: EF 46%.  Mildly reduced left ventricular systolic function.  Hypokinesis of apical septum with dyskinesis at the apex.  - N.p.o. for left heart cath today  - Cardiology consulted and following  - Continuous telemetry monitoring; monitoring for arrhythmia in the setting of ACS  - Discontinue heparin infusion

## 2025-07-18 NOTE — CONSULTS
Cardiology Initial Consultation    Date of Service  7/18/2025    Referring Physician  Ani Craven M.D.    Reason for Consultation  Elevated troponin level.      History of Presenting Illness  Tommy Carr is a 74 y.o. male with a past medical history of dyslipidemia, lifelong nonsmoker, unknown family history of coronary artery disease who presented 7/18/2025 as a transfer from [facility] for elevated troponin levels. He presented to their facility with dizziness following working out in the heat and consuming several beers. He denied chest pain.     Review of Systems  Review of Systems   Constitutional: Negative.  Negative for chills and fever.   HENT: Negative.  Negative for hearing loss.    Eyes: Negative.    Respiratory: Negative.  Negative for cough and shortness of breath.    Cardiovascular: Negative.  Negative for chest pain, palpitations and leg swelling.   Gastrointestinal: Negative.  Negative for abdominal pain, nausea and vomiting.   Genitourinary: Negative.  Negative for dysuria and urgency.   Musculoskeletal: Negative.  Negative for myalgias.   Skin: Negative.  Negative for rash.   Neurological:  Positive for dizziness and headaches. Negative for weakness.   Hematological:  Does not bruise/bleed easily.   Psychiatric/Behavioral: Negative.  The patient is not nervous/anxious.        Past Medical History   has a past medical history of Anesthesia, Arrhythmia, Cataract, Heart burn, High cholesterol, and Urinary incontinence.    Surgical History   has a past surgical history that includes cataract extraction with iol (2019); shoulder arthroscopy w/ rotator cuff repair (Left, 11/2018); pr endoscopic us exam, esoph (8/14/2020); pr ercp,diagnostic (8/14/2020); and gastroscopy-endo (8/14/2020).    Family History  No family history on file.    Social History   reports that he has never smoked. He has never used smokeless tobacco. He reports current alcohol use. He reports that he does  not use drugs.    Medications  Prior to Admission Medications   Prescriptions Last Dose Informant Patient Reported? Taking?   Cyanocobalamin (B-12 PO) 7/17/2025 Morning Patient Yes Yes   Sig: Take 1 Tablet by mouth every day.   Flaxseed, Linseed, (FLAX SEED OIL PO) 7/17/2025 Morning Patient Yes Yes   Sig: Take 1 Tablet by mouth every day.   Multiple Vitamins-Minerals (CENTRUM SILVER 50+MEN PO) 7/17/2025 Morning Patient Yes Yes   Sig: Take 1 Tablet by mouth every day.   Multiple Vitamins-Minerals (ZINC PO) 7/17/2025 Morning Patient Yes Yes   Sig: Take 1 Capsule by mouth every day.   NON SPECIFIED 7/17/2025 Morning Patient Yes Yes   Sig: Take 1 Tablet by mouth every day. Emergency vit C   atorvastatin (LIPITOR) 10 MG Tab 7/17/2025 Morning Patient Yes Yes   Sig: Take 10 mg by mouth every day.   omeprazole (PRILOSEC) 20 MG delayed-release capsule 7/17/2025 Morning Patient Yes Yes   Sig: Take 20 mg by mouth every day.      Facility-Administered Medications: None       Allergies  Allergies[1]    Vital signs in last 24 hours  Temp:  [36.1 °C (97 °F)-36.7 °C (98.1 °F)] 36.7 °C (98.1 °F)  Pulse:  [65-67] 67  Resp:  [16-18] 18  BP: (143-151)/(76-80) 143/76  SpO2:  [94 %-96 %] 94 %    Physical Exam  Physical Exam    Lab Review  Lab Results   Component Value Date/Time    WBC 8.6 07/18/2025 11:25 AM    RBC 4.35 (L) 07/18/2025 11:25 AM    HEMOGLOBIN 13.6 (L) 07/18/2025 11:25 AM    HEMATOCRIT 40.1 (L) 07/18/2025 11:25 AM    MCV 92.2 07/18/2025 11:25 AM    MCH 31.3 07/18/2025 11:25 AM    MCHC 33.9 07/18/2025 11:25 AM    MPV 8.8 (L) 07/18/2025 11:25 AM      Lab Results   Component Value Date/Time    SODIUM 137 07/18/2025 11:25 AM    POTASSIUM 4.5 07/18/2025 11:25 AM    CHLORIDE 104 07/18/2025 11:25 AM    CO2 23 07/18/2025 11:25 AM    GLUCOSE 137 (H) 07/18/2025 11:25 AM    BUN 11 07/18/2025 11:25 AM    CREATININE 0.79 07/18/2025 11:25 AM      Lab Results   Component Value Date/Time    ASTSGOT 22 07/18/2025 11:25 AM    ALTSGPT 15  "07/18/2025 11:25 AM     Lab Results   Component Value Date/Time    TROPONINT 41 (H) 07/18/2025 11:25 AM       No results for input(s): \"NTPROBNP\" in the last 72 hours.    Cardiac Imaging and Procedures Review  CARDIAC STUDIES/PROCEDURES:    EKG performed on (07/18/25) was reviewed: EKG personally interpreted shows sinus rhythm with right bundle branch block.     Assessment/Plan  Elevated troponin level: He is a 74 y.o. male with a past medical history of dyslipidemia, lifelong nonsmoker, unknown family history of coronary artery disease who presented as a transfer from [facility] for elevated troponin levels. We will perform an echocardiogram and myocardial perfusion imaging study.    Thank you for allowing me to participate in the care of this patient.    I will continue to follow this patient    Please contact me with any questions.    Joe Mcdonald M.D.   Cardiologist, [facility]  [phone]               [1] No Known Allergies    "

## 2025-07-18 NOTE — CARE PLAN
The patient is Stable - Low risk of patient condition declining or worsening    Shift Goals  Clinical Goals: cardiac workup  Patient Goals: rest    Progress made toward(s) clinical / shift goals:      Problem: Knowledge Deficit - Standard  Goal: Patient and family/care givers will demonstrate understanding of plan of care, disease process/condition, diagnostic tests and medications  Outcome: Progressing  Note: Plan of care d/w patient.       Patient is not progressing towards the following goals:

## 2025-07-18 NOTE — H&P
Hospital Medicine History & Physical Note    Date of Service  7/18/2025    Primary Care Physician  Pcp Pt States None    Consultants  cardiology    Specialist Names: Dr. Marino    Code Status  Full Code    Chief Complaint  No chief complaint on file.      History of Presenting Illness  Tommy Carr is a 74-year-old male with PMHx HDL and GERD.  Admitted 7/18 for presyncope and diaphoresis.    Prior history: Patient was working out in the heat yesterday.  He came into his kitchen where he began to feel lightheaded and confused.  This was immediately followed by severe diaphoresis and epigastric pain.  He was brought to the emergency room.    At OSH-patient was noted to have rising troponins.  EKG without ST depression or elevation consistent with ischemia.  Cardiology was consulted from OSH and recommended [facility] for formal consultation.    I discussed the plan of care with patient, bedside RN, and cardiology.    Review of Systems  Review of Systems   Constitutional:  Negative for fever and malaise/fatigue.   Respiratory:  Negative for shortness of breath.    Cardiovascular:  Negative for chest pain and leg swelling.   Gastrointestinal:  Negative for abdominal pain, nausea and vomiting.       Past Medical History   has a past medical history of Anesthesia, Arrhythmia, Cataract, Heart burn, High cholesterol, and Urinary incontinence.    Surgical History   has a past surgical history that includes cataract extraction with iol (2019); shoulder arthroscopy w/ rotator cuff repair (Left, 11/2018); pr endoscopic us exam, esoph (8/14/2020); pr ercp,diagnostic (8/14/2020); and gastroscopy-endo (8/14/2020).     Family History  No family history on file.     Family history reviewed with patient. There is no family history that is pertinent to the chief complaint.     Social History   reports that he has never smoked. He has never used smokeless tobacco. He reports current alcohol use. He reports that he does not use  drugs.    Allergies  Allergies[1]    Medications  Prior to Admission Medications   Prescriptions Last Dose Informant Patient Reported? Taking?   ASPIRIN 81 PO   Yes No   Sig: Take 81 mg by mouth.   Flaxseed, Linseed, (FLAX SEED OIL) 1000 MG Cap   Yes No   Sig: Take  by mouth every day.   Multiple Vitamins-Minerals (CENTRUM SILVER 50+MEN PO)   Yes No   Sig: Take  by mouth every day.   NON SPECIFIED   Yes No   Sig: Emergency vit C   Zinc 30 MG Cap   Yes No   Sig: Take 30 mg by mouth.   atorvastatin (LIPITOR) 10 MG Tab   Yes No   Sig: Take 10 mg by mouth every evening.   omeprazole (PRILOSEC) 20 MG delayed-release capsule   Yes No   Sig: Take 20 mg by mouth every day.      Facility-Administered Medications: None       Physical Exam  Temp:  [36.1 °C (97 °F)-36.7 °C (98.1 °F)] 36.7 °C (98.1 °F)  Pulse:  [65-67] 67  Resp:  [16-18] 18  BP: (143-151)/(76-80) 143/76  SpO2:  [94 %-96 %] 94 %  Blood Pressure : (!) 151/80   Temperature: 36.1 °C (97 °F)   Pulse: 65   Respiration: 16   Pulse Oximetry: 96 %       Physical Exam  Vitals and nursing note reviewed.   Constitutional:       General: He is not in acute distress.     Appearance: Normal appearance. He is not ill-appearing.   Cardiovascular:      Rate and Rhythm: Normal rate and regular rhythm.      Heart sounds: Murmur heard.      Comments: Systolic murmurs present  Pulmonary:      Effort: Pulmonary effort is normal.      Breath sounds: Normal breath sounds.   Musculoskeletal:         General: No swelling.   Skin:     General: Skin is warm and dry.      Coloration: Skin is pale.   Neurological:      Mental Status: He is alert and oriented to person, place, and time. Mental status is at baseline.   Psychiatric:         Mood and Affect: Mood normal.         Behavior: Behavior normal.         Laboratory:  Recent Labs     07/18/25  1125   WBC 8.6   RBC 4.35*   HEMOGLOBIN 13.6*   HEMATOCRIT 40.1*   MCV 92.2   MCH 31.3   MCHC 33.9   RDW 44.3   PLATELETCT 201   MPV 8.8*  "    Recent Labs     07/18/25  1125   SODIUM 137   POTASSIUM 4.5   CHLORIDE 104   CO2 23   GLUCOSE 137*   BUN 11   CREATININE 0.79   CALCIUM 8.9     Recent Labs     07/18/25  1125   ALTSGPT 15   ASTSGOT 22   ALKPHOSPHAT 57   TBILIRUBIN 0.7   GLUCOSE 137*     Recent Labs     07/18/25  1125   APTT 27.9   INR 1.03     No results for input(s): \"NTPROBNP\" in the last 72 hours.      Recent Labs     07/18/25  1125   TROPONINT 41*       Imaging:  No orders to display       EKG:  I have personally reviewed the images and compared with prior images. and My impression is: EKG showing Right bundle branch block; heart rate 66    Assessment/Plan:  Justification for Admission Status  I anticipate this patient will require at least two midnights for appropriate medical management, necessitating inpatient admission because NSTEMI, cardiology consultation, ACS management    Patient will need a Telemetry bed on MEDICAL service .  The need is secondary to monitoring for arrhythmia in setting of ACS.    * NSTEMI (non-ST elevated myocardial infarction) (HCC)- (present on admission)  Assessment & Plan  Presenting with rising troponins at OSH  No chest pain; diaphoresis and epigastric pain noted  EKG without ST elevation or depression consistent with ischemia  Troponin here 41  - Trend troponin   - Cardiology consulted  - Continuous telemetry monitoring; monitoring for arrhythmia in the setting of ACS  - Start heparin infusion  - Lipid panel and A1c with a.m. labs    GERD (gastroesophageal reflux disease)  Assessment & Plan  Continue omeprazole    Other hyperlipidemia  Assessment & Plan  Continue home atorvastatin    Patient is critically ill.   The patient continues to have: NSTEMI   The vital organ system that is affected is the: cardiovascular   If untreated there is a high chance of deterioration into: cardiac arrhythmia, cardiac arrest and eventually death.   The critical care that I am providing today is: initiation of heparin " infusion, close monitoring for bleeding and anemia in the setting of IV heparin infusion. Monitoring for life threatening ACS  The critical that has been undertaken is medically complex.   There has been no overlap in critical care time.   Critical Care Time not including procedures: 41      VTE prophylaxis: SCDs/TEDs and therapeutic anticoagulation with heparin infusion         [1] No Known Allergies

## 2025-07-18 NOTE — PROGRESS NOTES
Cardiology Interprofessional Telephone note:    I was called to discuss this patient's care with Dr. Benjamin Lincoln at [facility] to review an ECG.   We discussed patient presented with an episode of diaphoresis, lightheadedness and near syncope.  The clinical question is whether or not patient would receive a pacemaker for trifascicular block.  ECG shows right bundle branch block but shows inferior and anterior lateral Q waves.  Echocardiogram at minimum recommended.  No indication for pacemaker with this information.  I offered transfer for cardiac evaluation if there is concern for obstructive coronary disease.    At this time it was deemed no formal in person cardiology consultation was necessary, however if there are changes in patient condition or abnormal test results, please re-consult cardiology.

## 2025-07-18 NOTE — PROGRESS NOTES
[facility] DIRECT ADMISSION REPORT  Transferring facility: [facility]  Transferring physician: Dr. Benjamin Lincoln    Chief complaint: Epigastric pain with diaphoresis  Pertinent history & patient course: Patient is a 74-year-old male that presented to outside hospital for presyncopal episode with associated diaphoresis and epigastric pain.  Initially hypertensive with SBP into the 150s.  EKG without ST changes. Troponin 0.02>> 0.03>> 0.06 (less than 0.028 is normal).  Received  mg p.o. x 1 and started on heparin drip.  Pertinent imaging & lab results: 114/66, 62, 14, 98%-RA, 35.5C  Consultants called prior to transfer and pertinent input from consultants: Cardiology, Dr. Marino; this was before third troponin, initially contacted regarding trifascicular block on EKG with recommendation of troponin trend and echo.  Code Status: FULL CODE per transferring provider, I personally verified with the transferring provider patient's code status and the transferring provider has confirmed this with the patient.  Reason for Transfer: Cardiology consultation  Further work up or recommendations requested prior to transfer: None    Patient accepted for transfer: Yes  [facility] to notify the Triage Coordinator in the RTOC via Voalte or Phone ext. [phone] when patient arrives to the unit. The Triage Coordinator will assign the admitting provider.    Consultants to be called upon arrival: Cardiology  Admission status: Inpatient.   Floor requested: Telemetry  If ICU transfer, name of intensivist case discussed with and pertinent input from critical care: N/A    The admitting provider is the point of contact for questions or concerns regarding patient's care.

## 2025-07-18 NOTE — PROGRESS NOTES
4 Eyes Skin Assessment Completed by Charles MCCARTNEY RN and Naga LIN RN.    Skin assessment is primarily focused on high risk bony prominences. Pay special attention to skin beneath and around medical devices, high risk bony prominences, skin to skin areas and areas where the patient lacks sensation to feel pain and areas where the patient previously had breakdown.     Head (Occipital):  WDL   Ears (Under Medical Devices): WDL   Nose (Under Medical Devices): WDL   Mouth:  WDL   Neck: WDL   Breast/Chest:  WDL   Shoulder Blades:  WDL   Spine:   WDL   (R) Arm/Elbow/Hand: WDL   (L) Arm/Elbow/Hand: WDL   Abdomen: WDL   Pannus/Groin:  WDL   Sacrum/Coccyx:   WDL   (R) Ischial Tuberosity (Sit Bones):  WDL   (L) Ischial Tuberosity (Sit Bones):  WDL   (R) Leg:  WDL   (L) Leg:  WDL   (R) Heel:  WDL   (R) Foot/Toe: WDL   (L) Heel: WDL   (L) Foot/Toe:  WDL       DEVICES IN USE:   Respiratory Devices:  NA, patient on room air  Feeding Devices:  N/A   Lines & BP Monitoring Devices:  Peripheral IV    Orthopedic Devices:  N/A  Miscellaneous Devices:  Telemetry monitor    PROTOCOL INTERVENTIONS:   Standard/Trauma Bed:  Already in place    WOUND PHOTOS:   N/A no wounds identified    WOUND CONSULT:   N/A, no advanced wound care needs identified

## 2025-07-18 NOTE — PROGRESS NOTES
Med rec is complete per Patient at bedside.       Allergies reviewed.    Has patient had any outside antibiotics in the last 30 days? N    Antibiotics: nitrofurantoin, doxycycline, sulfamethoxazole-trimethoprim?N    Antiparasitics include: albendazole, ivermectin, pyrantel pamoate (OTC), mebendazole and metronidazole?N    Antivirals: acyclovir, valacyclovir?N    Antifungals: voriconazole, posaconazole, and isavuconazonium (Cresemba®)?N       Any Anticoagulants (rivaroxaban, apixaban, edoxaban, dabigatran, warfarin, enoxaparin) taken in the last 14 days? N         Pharmacy/Pharmacies Pt utilizes : [facility] for this visit.

## 2025-07-19 ENCOUNTER — APPOINTMENT (OUTPATIENT)
Dept: RADIOLOGY | Facility: MEDICAL CENTER | Age: 75
DRG: 322 | End: 2025-07-19
Attending: NURSE PRACTITIONER
Payer: COMMERCIAL

## 2025-07-19 ENCOUNTER — APPOINTMENT (OUTPATIENT)
Dept: CARDIOLOGY | Facility: MEDICAL CENTER | Age: 75
DRG: 322 | End: 2025-07-19
Attending: NURSE PRACTITIONER
Payer: COMMERCIAL

## 2025-07-19 LAB
ANION GAP SERPL CALC-SCNC: 10 MMOL/L (ref 7–16)
BUN SERPL-MCNC: 16 MG/DL (ref 8–22)
CALCIUM SERPL-MCNC: 8.9 MG/DL (ref 8.5–10.5)
CHLORIDE SERPL-SCNC: 102 MMOL/L (ref 96–112)
CHOLEST SERPL-MCNC: 155 MG/DL (ref 100–199)
CO2 SERPL-SCNC: 23 MMOL/L (ref 20–33)
CREAT SERPL-MCNC: 0.86 MG/DL (ref 0.5–1.4)
ERYTHROCYTE [DISTWIDTH] IN BLOOD BY AUTOMATED COUNT: 45 FL (ref 35.9–50)
GFR SERPLBLD CREATININE-BSD FMLA CKD-EPI: 89 ML/MIN/1.73 M 2
GLUCOSE SERPL-MCNC: 98 MG/DL (ref 65–99)
HCT VFR BLD AUTO: 39 % (ref 42–52)
HDLC SERPL-MCNC: 59 MG/DL
HGB BLD-MCNC: 13.6 G/DL (ref 14–18)
LDLC SERPL CALC-MCNC: 86 MG/DL
LV EJECT FRACT  99904: 46
LV EJECT FRACT MOD 4C 99902: 46.1
MCH RBC QN AUTO: 31.9 PG (ref 27–33)
MCHC RBC AUTO-ENTMCNC: 34.9 G/DL (ref 32.3–36.5)
MCV RBC AUTO: 91.5 FL (ref 81.4–97.8)
PLATELET # BLD AUTO: 196 K/UL (ref 164–446)
PMV BLD AUTO: 9.3 FL (ref 9–12.9)
POTASSIUM SERPL-SCNC: 4 MMOL/L (ref 3.6–5.5)
RBC # BLD AUTO: 4.26 M/UL (ref 4.7–6.1)
SODIUM SERPL-SCNC: 135 MMOL/L (ref 135–145)
TRIGL SERPL-MCNC: 50 MG/DL (ref 0–149)
TROPONIN T SERPL-MCNC: 40 NG/L (ref 6–19)
TROPONIN T SERPL-MCNC: 43 NG/L (ref 6–19)
UFH PPP CHRO-ACNC: 0.33 IU/ML
UFH PPP CHRO-ACNC: 0.42 IU/ML
WBC # BLD AUTO: 8.1 K/UL (ref 4.8–10.8)

## 2025-07-19 PROCEDURE — 99232 SBSQ HOSP IP/OBS MODERATE 35: CPT | Performed by: INTERNAL MEDICINE

## 2025-07-19 PROCEDURE — 700102 HCHG RX REV CODE 250 W/ 637 OVERRIDE(OP): Performed by: NURSE PRACTITIONER

## 2025-07-19 PROCEDURE — 93306 TTE W/DOPPLER COMPLETE: CPT | Mod: 26 | Performed by: INTERNAL MEDICINE

## 2025-07-19 PROCEDURE — 700111 HCHG RX REV CODE 636 W/ 250 OVERRIDE (IP): Mod: JZ | Performed by: STUDENT IN AN ORGANIZED HEALTH CARE EDUCATION/TRAINING PROGRAM

## 2025-07-19 PROCEDURE — 80048 BASIC METABOLIC PNL TOTAL CA: CPT

## 2025-07-19 PROCEDURE — 85027 COMPLETE CBC AUTOMATED: CPT

## 2025-07-19 PROCEDURE — 770020 HCHG ROOM/CARE - TELE (206)

## 2025-07-19 PROCEDURE — 700117 HCHG RX CONTRAST REV CODE 255: Performed by: NURSE PRACTITIONER

## 2025-07-19 PROCEDURE — 700111 HCHG RX REV CODE 636 W/ 250 OVERRIDE (IP)

## 2025-07-19 PROCEDURE — A9270 NON-COVERED ITEM OR SERVICE: HCPCS | Performed by: NURSE PRACTITIONER

## 2025-07-19 PROCEDURE — A9270 NON-COVERED ITEM OR SERVICE: HCPCS | Performed by: STUDENT IN AN ORGANIZED HEALTH CARE EDUCATION/TRAINING PROGRAM

## 2025-07-19 PROCEDURE — 78452 HT MUSCLE IMAGE SPECT MULT: CPT

## 2025-07-19 PROCEDURE — 80061 LIPID PANEL: CPT

## 2025-07-19 PROCEDURE — 84484 ASSAY OF TROPONIN QUANT: CPT

## 2025-07-19 PROCEDURE — 93306 TTE W/DOPPLER COMPLETE: CPT

## 2025-07-19 PROCEDURE — 36415 COLL VENOUS BLD VENIPUNCTURE: CPT

## 2025-07-19 PROCEDURE — 99233 SBSQ HOSP IP/OBS HIGH 50: CPT | Performed by: STUDENT IN AN ORGANIZED HEALTH CARE EDUCATION/TRAINING PROGRAM

## 2025-07-19 PROCEDURE — 700102 HCHG RX REV CODE 250 W/ 637 OVERRIDE(OP): Performed by: STUDENT IN AN ORGANIZED HEALTH CARE EDUCATION/TRAINING PROGRAM

## 2025-07-19 PROCEDURE — 85520 HEPARIN ASSAY: CPT

## 2025-07-19 RX ORDER — ASPIRIN 81 MG/1
81 TABLET ORAL DAILY
Status: DISCONTINUED | OUTPATIENT
Start: 2025-07-19 | End: 2025-07-21 | Stop reason: HOSPADM

## 2025-07-19 RX ORDER — AMINOPHYLLINE 25 MG/ML
100 INJECTION, SOLUTION INTRAVENOUS
Status: DISCONTINUED | OUTPATIENT
Start: 2025-07-19 | End: 2025-07-21 | Stop reason: HOSPADM

## 2025-07-19 RX ORDER — ENOXAPARIN SODIUM 100 MG/ML
40 INJECTION SUBCUTANEOUS DAILY
Status: DISCONTINUED | OUTPATIENT
Start: 2025-07-19 | End: 2025-07-21 | Stop reason: HOSPADM

## 2025-07-19 RX ORDER — REGADENOSON 0.08 MG/ML
0.4 INJECTION, SOLUTION INTRAVENOUS ONCE
Status: COMPLETED | OUTPATIENT
Start: 2025-07-19 | End: 2025-07-19

## 2025-07-19 RX ORDER — REGADENOSON 0.08 MG/ML
INJECTION, SOLUTION INTRAVENOUS
Status: COMPLETED
Start: 2025-07-19 | End: 2025-07-19

## 2025-07-19 RX ADMIN — ENOXAPARIN SODIUM 40 MG: 100 INJECTION SUBCUTANEOUS at 17:01

## 2025-07-19 RX ADMIN — ATORVASTATIN CALCIUM 10 MG: 10 TABLET, FILM COATED ORAL at 17:01

## 2025-07-19 RX ADMIN — REGADENOSON 0.4 MG: 0.08 INJECTION, SOLUTION INTRAVENOUS at 12:00

## 2025-07-19 RX ADMIN — HUMAN ALBUMIN MICROSPHERES AND PERFLUTREN 3 ML: 10; .22 INJECTION, SOLUTION INTRAVENOUS at 09:15

## 2025-07-19 RX ADMIN — ASPIRIN 81 MG: 81 TABLET, COATED ORAL at 08:40

## 2025-07-19 RX ADMIN — ANTACID TABLETS 500 MG: 500 TABLET, CHEWABLE ORAL at 19:10

## 2025-07-19 ASSESSMENT — ENCOUNTER SYMPTOMS
NAUSEA: 0
MUSCULOSKELETAL NEGATIVE: 1
FEVER: 0
WEAKNESS: 0
PSYCHIATRIC NEGATIVE: 1
COUGH: 0
CONSTITUTIONAL NEGATIVE: 1
CHILLS: 0
SHORTNESS OF BREATH: 0
ABDOMINAL PAIN: 0
BRUISES/BLEEDS EASILY: 0
DIZZINESS: 0
EYES NEGATIVE: 1
CARDIOVASCULAR NEGATIVE: 1
NEUROLOGICAL NEGATIVE: 1
VOMITING: 0
HEADACHES: 0
MYALGIAS: 0
RESPIRATORY NEGATIVE: 1
PALPITATIONS: 0
NERVOUS/ANXIOUS: 0
GASTROINTESTINAL NEGATIVE: 1

## 2025-07-19 ASSESSMENT — PATIENT HEALTH QUESTIONNAIRE - PHQ9
SUM OF ALL RESPONSES TO PHQ9 QUESTIONS 1 AND 2: 0
1. LITTLE INTEREST OR PLEASURE IN DOING THINGS: NOT AT ALL
2. FEELING DOWN, DEPRESSED, IRRITABLE, OR HOPELESS: NOT AT ALL

## 2025-07-19 ASSESSMENT — FIBROSIS 4 INDEX: FIB4 SCORE: 2.2

## 2025-07-19 NOTE — PROGRESS NOTES
Bedside report completed and updated pt with plan of care. Call light in reach and encourage to call for assistance.

## 2025-07-19 NOTE — CARE PLAN
The patient is Stable - Low risk of patient condition declining or worsening    Shift Goals  Clinical Goals: monitor cardiac status, vss, safety  Patient Goals: sleep  Family Goals: lucía    Progress made toward(s) clinical / shift goals:    Problem: Knowledge Deficit - Standard  Goal: Patient and family/care givers will demonstrate understanding of plan of care, disease process/condition, diagnostic tests and medications  Outcome: Progressing     Problem: Safety  Goal: Will remain free from injury  Outcome: Progressing  Goal: Will remain free from falls  Outcome: Progressing     Problem: Pain Management  Goal: Pain level will decrease to patient's comfort goal  Outcome: Progressing     Problem: Fluid Volume:  Goal: Will maintain balanced intake and output  Outcome: Progressing     Problem: Skin Integrity  Goal: Risk for impaired skin integrity will decrease  Outcome: Progressing     Problem: Mobility  Goal: Risk for activity intolerance will decrease  Outcome: Progressing       Patient is not progressing towards the following goals:

## 2025-07-19 NOTE — CARE PLAN
Problem: Knowledge Deficit - Standard  Goal: Patient and family/care givers will demonstrate understanding of plan of care, disease process/condition, diagnostic tests and medications  Outcome: Progressing     Problem: Safety  Goal: Will remain free from injury  Outcome: Progressing     Problem: Pain Management  Goal: Pain level will decrease to patient's comfort goal  Outcome: Progressing   The patient is Stable - Low risk of patient condition declining or worsening    Shift Goals  Clinical Goals: stress test vss  Patient Goals: home soon  Family Goals: updates    Progress made toward(s) clinical / shift goals:        Patient is not progressing towards the following goals:

## 2025-07-19 NOTE — PROGRESS NOTES
Telemetry Shift Summary    Rhythm SR  HR Range 63-67  Ectopy rare pvcs  Measurements 0.17/0.10/0.40        Normal Values  Rhythm SR  HR Range    Measurements 0.12-0.20 / 0.06-0.10  / 0.30-0.52

## 2025-07-19 NOTE — PROGRESS NOTES
Monitor Summary  Rhythm: SB/SR  Rate: 56-82  Ectopy: rare pvc's  Measurements: 0.18/0.14/0.40

## 2025-07-19 NOTE — PROGRESS NOTES
[facility] Medicine Daily Progress Note    Date of Service  7/19/2025    Chief Complaint  Tommy Carr is a 74 y.o. male admitted 7/18/2025 with diaphoresis and near syncope    Hospital Course  Tommy Carr is a 74-year-old male with PMHx HDL and GERD.  Admitted 7/18 for presyncope and diaphoresis.    Prior history: Patient was working out in the heat yesterday.  He came into his kitchen where he began to feel lightheaded and confused.  This was immediately followed by severe diaphoresis and epigastric pain.  He was brought to the emergency room.    At OSH-patient was noted to have rising troponins.  EKG without ST depression or elevation consistent with ischemia.  Cardiology was consulted from OSH and recommended [facility] for formal consultation.    Interval Problem Update  7/19: Vitals stable overnight.  SBP ranging 115 through 131.  Hb stable at 13.6.  Troponin trend 41, 42, 43.  Cardiology consulted yesterday.  Recommending echocardiogram and stress test.  Both are pending.  Discontinue heparin drip.    I have discussed this patient's plan of care and discharge plan at IDT rounds today with Case Management, Nursing, Nursing leadership, and other members of the IDT team.    Consultants/Specialty  cardiology    Code Status  Full Code    Disposition  The patient is not medically cleared for discharge to home or a post-acute facility.  Anticipate discharge to: home with close outpatient follow-up    I have placed the appropriate orders for post-discharge needs.    Review of Systems  Review of Systems   Constitutional:  Negative for fever and malaise/fatigue.   Respiratory:  Negative for shortness of breath.    Cardiovascular:  Negative for chest pain and leg swelling.   Gastrointestinal:  Negative for abdominal pain, nausea and vomiting.        Physical Exam  Temp:  [36.4 °C (97.6 °F)-36.9 °C (98.4 °F)] 36.5 °C (97.7 °F)  Pulse:  [60-81] 75  Resp:  [16-18] 18  BP: (115-131)/(61-71) 116/67  SpO2:  [92 %-95 %] 94  %    Physical Exam  Vitals and nursing note reviewed.   Constitutional:       General: He is not in acute distress.     Appearance: Normal appearance. He is not ill-appearing.   Cardiovascular:      Rate and Rhythm: Normal rate and regular rhythm.      Heart sounds: Murmur heard.   Pulmonary:      Effort: Pulmonary effort is normal. No respiratory distress.      Breath sounds: Normal breath sounds. No wheezing.   Skin:     General: Skin is warm and dry.   Neurological:      Mental Status: He is alert and oriented to person, place, and time. Mental status is at baseline.   Psychiatric:         Mood and Affect: Mood normal.         Behavior: Behavior normal.         Fluids    Intake/Output Summary (Last 24 hours) at 7/19/2025 1510  Last data filed at 7/18/2025 1958  Gross per 24 hour   Intake 250 ml   Output --   Net 250 ml        Laboratory  Recent Labs     07/18/25  1125 07/19/25  0005   WBC 8.6 8.1   RBC 4.35* 4.26*   HEMOGLOBIN 13.6* 13.6*   HEMATOCRIT 40.1* 39.0*   MCV 92.2 91.5   MCH 31.3 31.9   MCHC 33.9 34.9   RDW 44.3 45.0   PLATELETCT 201 196   MPV 8.8* 9.3     Recent Labs     07/18/25  1125 07/19/25  0005   SODIUM 137 135   POTASSIUM 4.5 4.0   CHLORIDE 104 102   CO2 23 23   GLUCOSE 137* 98   BUN 11 16   CREATININE 0.79 0.86   CALCIUM 8.9 8.9     Recent Labs     07/18/25  1125   APTT 27.9   INR 1.03         Recent Labs     07/19/25  0005   TRIGLYCERIDE 50   HDL 59   LDL 86       Imaging  NM-CARDIAC STRESS TEST   Final Result      EC-ECHOCARDIOGRAM COMPLETE W/ CONT              Assessment/Plan  * NSTEMI (non-ST elevated myocardial infarction) ([facility])- (present on admission)  Assessment & Plan  Presenting with rising troponins at OSH  No chest pain; diaphoresis and epigastric pain noted  EKG without ST elevation or depression consistent with ischemia  Troponin here 41, 42, 43  - Stress test and echo pending    - Cardiology consulted and following  - Continuous telemetry monitoring; monitoring for arrhythmia in  the setting of ACS  - Discontinue heparin infusion    GERD (gastroesophageal reflux disease)  Assessment & Plan  Continue omeprazole    Other hyperlipidemia  Assessment & Plan  Continue home atorvastatin         VTE prophylaxis:   SCDs/TEDs   enoxaparin ppx      I have performed a physical exam and reviewed and updated ROS and Plan today (7/19/2025). In review of yesterday's note (7/18/2025), there are no changes except as documented above.

## 2025-07-19 NOTE — PROGRESS NOTES
Cardiology Follow Up Progress Note    Date of Service  7/19/2025    Attending Physician  Ani Craven M.D.    Chief Complaint   Elevated troponin level, history of dyslipidemia.     HPI  Tommy Carr is a 74 y.o. male admitted 7/18/2025 with above.    Interim Events  No significant changes noted from cardiac standpoint within the past 24 hours.     Review of Systems  Review of Systems   Constitutional: Negative.  Negative for chills and fever.   HENT: Negative.  Negative for hearing loss.    Eyes: Negative.    Respiratory: Negative.  Negative for cough and shortness of breath.    Cardiovascular: Negative.  Negative for chest pain, palpitations and leg swelling.   Gastrointestinal: Negative.  Negative for abdominal pain, nausea and vomiting.   Genitourinary: Negative.  Negative for dysuria and urgency.   Musculoskeletal: Negative.  Negative for myalgias.   Skin: Negative.  Negative for rash.   Neurological: Negative.  Negative for dizziness, weakness and headaches.   Hematological:  Does not bruise/bleed easily.   Psychiatric/Behavioral: Negative.  The patient is not nervous/anxious.        Vital signs in last 24 hours  Temp:  [36.1 °C (97 °F)-36.9 °C (98.4 °F)] 36.4 °C (97.6 °F)  Pulse:  [60-81] 60  Resp:  [16-18] 18  BP: (115-151)/(61-80) 124/68  SpO2:  [92 %-96 %] 92 %    Physical Exam  Physical Exam  Constitutional:       Appearance: Normal appearance. He is well-developed and normal weight.   HENT:      Head: Normocephalic and atraumatic.      Mouth/Throat:      Mouth: Mucous membranes are moist.   Eyes:      Extraocular Movements: Extraocular movements intact.      Conjunctiva/sclera: Conjunctivae normal.   Cardiovascular:      Rate and Rhythm: Normal rate and regular rhythm.      Pulses: Normal pulses.      Heart sounds: Normal heart sounds.   Pulmonary:      Effort: Pulmonary effort is normal.      Breath sounds: Normal breath sounds.   Abdominal:      General: Bowel sounds are normal.       "Palpations: Abdomen is soft.   Musculoskeletal:         General: Normal range of motion.      Cervical back: Normal range of motion and neck supple.   Skin:     General: Skin is warm and dry.   Neurological:      General: No focal deficit present.      Mental Status: He is alert and oriented to person, place, and time. Mental status is at baseline.   Psychiatric:         Mood and Affect: Mood normal.         Behavior: Behavior normal.         Thought Content: Thought content normal.         Judgment: Judgment normal.         Lab Review  Lab Results   Component Value Date/Time    WBC 8.1 07/19/2025 12:05 AM    RBC 4.26 (L) 07/19/2025 12:05 AM    HEMOGLOBIN 13.6 (L) 07/19/2025 12:05 AM    HEMATOCRIT 39.0 (L) 07/19/2025 12:05 AM    MCV 91.5 07/19/2025 12:05 AM    MCH 31.9 07/19/2025 12:05 AM    MCHC 34.9 07/19/2025 12:05 AM    MPV 9.3 07/19/2025 12:05 AM      Lab Results   Component Value Date/Time    SODIUM 135 07/19/2025 12:05 AM    POTASSIUM 4.0 07/19/2025 12:05 AM    CHLORIDE 102 07/19/2025 12:05 AM    CO2 23 07/19/2025 12:05 AM    GLUCOSE 98 07/19/2025 12:05 AM    BUN 16 07/19/2025 12:05 AM    CREATININE 0.86 07/19/2025 12:05 AM      Lab Results   Component Value Date/Time    ASTSGOT 22 07/18/2025 11:25 AM    ALTSGPT 15 07/18/2025 11:25 AM     Lab Results   Component Value Date/Time    CHOLSTRLTOT 155 07/19/2025 12:05 AM    LDL 86 07/19/2025 12:05 AM    HDL 59 07/19/2025 12:05 AM    TRIGLYCERIDE 50 07/19/2025 12:05 AM    TROPONINT 43 (H) 07/19/2025 12:05 AM       No results for input(s): \"NTPROBNP\" in the last 72 hours.    Cardiac Imaging and Procedures Review  CARDIAC STUDIES/PROCEDURES:     EKG performed on (07/18/25) was reviewed: EKG personally interpreted shows sinus rhythm with right bundle branch block.     Assessment/Plan  Elevated troponin level, history of dyslipidemia: He is clinically doing well, from cardiac standpoint. We will perform an echocardiogram and myocardial perfusion imaging study.    Thank " you for allowing me to participate in the care of this patient.  I will continue to follow this patient    Please contact me with any questions.    Joe Mcdonald M.D.   Cardiologist, [facility]  [phone]

## 2025-07-20 ENCOUNTER — APPOINTMENT (OUTPATIENT)
Dept: CARDIOLOGY | Facility: MEDICAL CENTER | Age: 75
DRG: 322 | End: 2025-07-20
Attending: NURSE PRACTITIONER
Payer: COMMERCIAL

## 2025-07-20 PROBLEM — I50.20 HFREF (HEART FAILURE WITH REDUCED EJECTION FRACTION) (HCC): Status: ACTIVE | Noted: 2025-07-20

## 2025-07-20 LAB
ACT BLD: 205 S (ref 74–137)
ACT BLD: 245 S (ref 74–137)
ACT BLD: 268 S (ref 74–137)
EKG IMPRESSION: NORMAL
ERYTHROCYTE [DISTWIDTH] IN BLOOD BY AUTOMATED COUNT: 45.6 FL (ref 35.9–50)
HCT VFR BLD AUTO: 44.4 % (ref 42–52)
HGB BLD-MCNC: 14.7 G/DL (ref 14–18)
MCH RBC QN AUTO: 31.1 PG (ref 27–33)
MCHC RBC AUTO-ENTMCNC: 33.1 G/DL (ref 32.3–36.5)
MCV RBC AUTO: 94.1 FL (ref 81.4–97.8)
PLATELET # BLD AUTO: 217 K/UL (ref 164–446)
PMV BLD AUTO: 9.2 FL (ref 9–12.9)
RBC # BLD AUTO: 4.72 M/UL (ref 4.7–6.1)
WBC # BLD AUTO: 6.8 K/UL (ref 4.8–10.8)

## 2025-07-20 PROCEDURE — 4A023N7 MEASUREMENT OF CARDIAC SAMPLING AND PRESSURE, LEFT HEART, PERCUTANEOUS APPROACH: ICD-10-PCS | Performed by: INTERNAL MEDICINE

## 2025-07-20 PROCEDURE — 93005 ELECTROCARDIOGRAM TRACING: CPT | Mod: TC | Performed by: INTERNAL MEDICINE

## 2025-07-20 PROCEDURE — 700105 HCHG RX REV CODE 258: Performed by: NURSE PRACTITIONER

## 2025-07-20 PROCEDURE — 99153 MOD SED SAME PHYS/QHP EA: CPT

## 2025-07-20 PROCEDURE — 99152 MOD SED SAME PHYS/QHP 5/>YRS: CPT | Performed by: INTERNAL MEDICINE

## 2025-07-20 PROCEDURE — B2111ZZ FLUOROSCOPY OF MULTIPLE CORONARY ARTERIES USING LOW OSMOLAR CONTRAST: ICD-10-PCS | Performed by: INTERNAL MEDICINE

## 2025-07-20 PROCEDURE — 92978 ENDOLUMINL IVUS OCT C 1ST: CPT | Mod: 26,LD | Performed by: INTERNAL MEDICINE

## 2025-07-20 PROCEDURE — A9270 NON-COVERED ITEM OR SERVICE: HCPCS | Performed by: NURSE PRACTITIONER

## 2025-07-20 PROCEDURE — 770020 HCHG ROOM/CARE - TELE (206)

## 2025-07-20 PROCEDURE — A9270 NON-COVERED ITEM OR SERVICE: HCPCS

## 2025-07-20 PROCEDURE — 700105 HCHG RX REV CODE 258: Performed by: INTERNAL MEDICINE

## 2025-07-20 PROCEDURE — 93010 ELECTROCARDIOGRAM REPORT: CPT | Performed by: INTERNAL MEDICINE

## 2025-07-20 PROCEDURE — 99233 SBSQ HOSP IP/OBS HIGH 50: CPT | Mod: 25 | Performed by: INTERNAL MEDICINE

## 2025-07-20 PROCEDURE — A9270 NON-COVERED ITEM OR SERVICE: HCPCS | Performed by: STUDENT IN AN ORGANIZED HEALTH CARE EDUCATION/TRAINING PROGRAM

## 2025-07-20 PROCEDURE — 93458 L HRT ARTERY/VENTRICLE ANGIO: CPT | Mod: 26,59 | Performed by: INTERNAL MEDICINE

## 2025-07-20 PROCEDURE — 700105 HCHG RX REV CODE 258

## 2025-07-20 PROCEDURE — 700111 HCHG RX REV CODE 636 W/ 250 OVERRIDE (IP)

## 2025-07-20 PROCEDURE — 027035Z DILATION OF CORONARY ARTERY, ONE ARTERY WITH TWO DRUG-ELUTING INTRALUMINAL DEVICES, PERCUTANEOUS APPROACH: ICD-10-PCS | Performed by: INTERNAL MEDICINE

## 2025-07-20 PROCEDURE — 92920 PRQ TRLUML C ANGIOP 1ART&/BR: CPT | Mod: 59,RI | Performed by: INTERNAL MEDICINE

## 2025-07-20 PROCEDURE — 92928 PRQ TCAT PLMT NTRAC ST 1 LES: CPT | Mod: LD | Performed by: INTERNAL MEDICINE

## 2025-07-20 PROCEDURE — 700111 HCHG RX REV CODE 636 W/ 250 OVERRIDE (IP): Mod: JZ | Performed by: STUDENT IN AN ORGANIZED HEALTH CARE EDUCATION/TRAINING PROGRAM

## 2025-07-20 PROCEDURE — 700101 HCHG RX REV CODE 250

## 2025-07-20 PROCEDURE — 99233 SBSQ HOSP IP/OBS HIGH 50: CPT | Performed by: STUDENT IN AN ORGANIZED HEALTH CARE EDUCATION/TRAINING PROGRAM

## 2025-07-20 PROCEDURE — 02703ZZ DILATION OF CORONARY ARTERY, ONE ARTERY, PERCUTANEOUS APPROACH: ICD-10-PCS | Performed by: INTERNAL MEDICINE

## 2025-07-20 PROCEDURE — 700117 HCHG RX CONTRAST REV CODE 255: Performed by: INTERNAL MEDICINE

## 2025-07-20 PROCEDURE — B240ZZ3 ULTRASONOGRAPHY OF SINGLE CORONARY ARTERY, INTRAVASCULAR: ICD-10-PCS | Performed by: INTERNAL MEDICINE

## 2025-07-20 PROCEDURE — 700102 HCHG RX REV CODE 250 W/ 637 OVERRIDE(OP): Performed by: NURSE PRACTITIONER

## 2025-07-20 PROCEDURE — 85347 COAGULATION TIME ACTIVATED: CPT | Performed by: STUDENT IN AN ORGANIZED HEALTH CARE EDUCATION/TRAINING PROGRAM

## 2025-07-20 PROCEDURE — 700102 HCHG RX REV CODE 250 W/ 637 OVERRIDE(OP): Performed by: STUDENT IN AN ORGANIZED HEALTH CARE EDUCATION/TRAINING PROGRAM

## 2025-07-20 PROCEDURE — 700102 HCHG RX REV CODE 250 W/ 637 OVERRIDE(OP)

## 2025-07-20 PROCEDURE — 85027 COMPLETE CBC AUTOMATED: CPT

## 2025-07-20 RX ORDER — HEPARIN SODIUM 200 [USP'U]/100ML
INJECTION, SOLUTION INTRAVENOUS
Status: COMPLETED
Start: 2025-07-20 | End: 2025-07-20

## 2025-07-20 RX ORDER — ASPIRIN 81 MG/1
81 TABLET ORAL DAILY
Status: DISCONTINUED | OUTPATIENT
Start: 2025-07-20 | End: 2025-07-20

## 2025-07-20 RX ORDER — HEPARIN SODIUM 1000 [USP'U]/ML
INJECTION, SOLUTION INTRAVENOUS; SUBCUTANEOUS
Status: COMPLETED
Start: 2025-07-20 | End: 2025-07-20

## 2025-07-20 RX ORDER — CLOPIDOGREL 300 MG/1
600 TABLET, FILM COATED ORAL ONCE
Status: DISCONTINUED | OUTPATIENT
Start: 2025-07-20 | End: 2025-07-20

## 2025-07-20 RX ORDER — LIDOCAINE HYDROCHLORIDE 20 MG/ML
INJECTION, SOLUTION INFILTRATION; PERINEURAL
Status: COMPLETED
Start: 2025-07-20 | End: 2025-07-20

## 2025-07-20 RX ORDER — MIDAZOLAM HYDROCHLORIDE 1 MG/ML
INJECTION INTRAMUSCULAR; INTRAVENOUS
Status: COMPLETED
Start: 2025-07-20 | End: 2025-07-20

## 2025-07-20 RX ORDER — ATORVASTATIN CALCIUM 40 MG/1
40 TABLET, FILM COATED ORAL EVERY EVENING
Status: DISCONTINUED | OUTPATIENT
Start: 2025-07-20 | End: 2025-07-21 | Stop reason: HOSPADM

## 2025-07-20 RX ORDER — SODIUM CHLORIDE 9 MG/ML
3 INJECTION, SOLUTION INTRAVENOUS CONTINUOUS
Status: ACTIVE | OUTPATIENT
Start: 2025-07-20 | End: 2025-07-20

## 2025-07-20 RX ORDER — CLOPIDOGREL BISULFATE 75 MG/1
75 TABLET ORAL DAILY
Status: DISCONTINUED | OUTPATIENT
Start: 2025-07-21 | End: 2025-07-21 | Stop reason: HOSPADM

## 2025-07-20 RX ORDER — SODIUM CHLORIDE 9 MG/ML
INJECTION, SOLUTION INTRAVENOUS CONTINUOUS
Status: DISCONTINUED | OUTPATIENT
Start: 2025-07-20 | End: 2025-07-20

## 2025-07-20 RX ORDER — VERAPAMIL HYDROCHLORIDE 2.5 MG/ML
INJECTION INTRAVENOUS
Status: COMPLETED
Start: 2025-07-20 | End: 2025-07-20

## 2025-07-20 RX ORDER — OMEPRAZOLE 20 MG/1
20 CAPSULE, DELAYED RELEASE ORAL DAILY
Status: DISCONTINUED | OUTPATIENT
Start: 2025-07-20 | End: 2025-07-21 | Stop reason: HOSPADM

## 2025-07-20 RX ORDER — CLOPIDOGREL 300 MG/1
TABLET, FILM COATED ORAL
Status: COMPLETED
Start: 2025-07-20 | End: 2025-07-20

## 2025-07-20 RX ADMIN — MIDAZOLAM HYDROCHLORIDE 2 MG: 1 INJECTION, SOLUTION INTRAMUSCULAR; INTRAVENOUS at 12:21

## 2025-07-20 RX ADMIN — HEPARIN SODIUM: 1000 INJECTION, SOLUTION INTRAVENOUS; SUBCUTANEOUS at 11:28

## 2025-07-20 RX ADMIN — VERAPAMIL HYDROCHLORIDE 5 MG: 2.5 INJECTION, SOLUTION INTRAVENOUS at 11:29

## 2025-07-20 RX ADMIN — CLOPIDOGREL BISULFATE 600 MG: 300 TABLET, FILM COATED ORAL at 12:42

## 2025-07-20 RX ADMIN — NITROGLYCERIN 10 ML: 5 INJECTION, SOLUTION INTRAVENOUS at 11:29

## 2025-07-20 RX ADMIN — ASPIRIN 81 MG: 81 TABLET, COATED ORAL at 05:01

## 2025-07-20 RX ADMIN — ENOXAPARIN SODIUM 40 MG: 100 INJECTION SUBCUTANEOUS at 17:31

## 2025-07-20 RX ADMIN — IOHEXOL 80 ML: 350 INJECTION, SOLUTION INTRAVENOUS at 12:41

## 2025-07-20 RX ADMIN — HEPARIN SODIUM: 1000 INJECTION, SOLUTION INTRAVENOUS; SUBCUTANEOUS at 12:20

## 2025-07-20 RX ADMIN — SODIUM CHLORIDE: 9 INJECTION, SOLUTION INTRAVENOUS at 08:41

## 2025-07-20 RX ADMIN — FENTANYL CITRATE 100 MCG: 50 INJECTION, SOLUTION INTRAMUSCULAR; INTRAVENOUS at 12:20

## 2025-07-20 RX ADMIN — OMEPRAZOLE 20 MG: 20 CAPSULE, DELAYED RELEASE ORAL at 05:01

## 2025-07-20 RX ADMIN — FENTANYL CITRATE 100 MCG: 50 INJECTION, SOLUTION INTRAMUSCULAR; INTRAVENOUS at 12:21

## 2025-07-20 RX ADMIN — HEPARIN SODIUM 2000 UNITS: 200 INJECTION, SOLUTION INTRAVENOUS at 11:27

## 2025-07-20 RX ADMIN — LIDOCAINE HYDROCHLORIDE: 20 INJECTION, SOLUTION INFILTRATION; PERINEURAL at 11:29

## 2025-07-20 RX ADMIN — SODIUM CHLORIDE 3 ML/KG/HR: 9 INJECTION, SOLUTION INTRAVENOUS at 13:39

## 2025-07-20 RX ADMIN — ATORVASTATIN CALCIUM 40 MG: 40 TABLET, FILM COATED ORAL at 17:31

## 2025-07-20 RX ADMIN — MIDAZOLAM HYDROCHLORIDE 0.5 MG: 1 INJECTION, SOLUTION INTRAMUSCULAR; INTRAVENOUS at 12:41

## 2025-07-20 ASSESSMENT — ENCOUNTER SYMPTOMS
NAUSEA: 0
NEUROLOGICAL NEGATIVE: 1
PALPITATIONS: 0
SHORTNESS OF BREATH: 0
WEAKNESS: 0
RESPIRATORY NEGATIVE: 1
NERVOUS/ANXIOUS: 0
CHILLS: 0
COUGH: 0
PSYCHIATRIC NEGATIVE: 1
MUSCULOSKELETAL NEGATIVE: 1
DIZZINESS: 0
HEADACHES: 0
EYES NEGATIVE: 1
BRUISES/BLEEDS EASILY: 0
CARDIOVASCULAR NEGATIVE: 1
FEVER: 0
VOMITING: 0
GASTROINTESTINAL NEGATIVE: 1
CONSTITUTIONAL NEGATIVE: 1
MYALGIAS: 0
ABDOMINAL PAIN: 0

## 2025-07-20 ASSESSMENT — PAIN DESCRIPTION - PAIN TYPE
TYPE: ACUTE PAIN
TYPE: ACUTE PAIN

## 2025-07-20 ASSESSMENT — FIBROSIS 4 INDEX: FIB4 SCORE: 2.2

## 2025-07-20 NOTE — PROGRESS NOTES
Monitor Summary  Rhythm: SR   Rate: 63-70  Ectopy: R PVC /   Measurements: .18/.14/.40  ---12 hr Chart Review---

## 2025-07-20 NOTE — DIETARY
Nutrition Services: Cardiac Education Consult   Day 2 of admit.  Tommy Carr is a 74 y.o. male with admitting DX of NSTEMI (non-ST elevated myocardial infarction)     RD received consult for heart healthy diet education. RD included nutrition recommendations and tips in dietary discharge instructions that align with pt's current dx. Outpatient resources included to encourage follow-up with [facility] for continued support after discharge.     No other education needs identified at this time. Please consult RD as needed for supplemental education or at request of pt.

## 2025-07-20 NOTE — PROGRESS NOTES
Pt arrived to floor at 1302 with cath lab RN. Femoral groin site CDI with gauze/tegarderm . R radial wrist TR band in place, no oozing noted. Pt on bedrest until 1345. Wife at bedside. VSS, updated on plan of care. Call light in reach, bed alarm in place, bed in low/locked position. Sensation to all 4 extremities warm, intact. Pedal pulses present bilaterally.

## 2025-07-20 NOTE — ASSESSMENT & PLAN NOTE
New diagnosis  Likely secondary to ischemic cardiomyopathy  - Left heart plan for today  - Cardiology following  - Initiate GDMT after Cath Lab and per cardiology recommendations

## 2025-07-20 NOTE — PROGRESS NOTES
Cardiology Follow Up Progress Note    Date of Service  7/20/2025    Attending Physician  Ani Craven M.D.    Chief Complaint   Elevated troponin level, abnormal myocardial perfusion imaging study, cardiomyopathy, history of dyslipidemia.     HPI  Tommy Carr is a 74 y.o. male admitted 7/18/2025 with above.    Interim Events  He underwent an echocardiogram which revealed mildly reduced left ventricular systolic function and a myocardial perfusion imaging study which was abnormal as described below.     Review of Systems  Review of Systems   Constitutional: Negative.  Negative for chills and fever.   HENT: Negative.  Negative for hearing loss.    Eyes: Negative.    Respiratory: Negative.  Negative for cough and shortness of breath.    Cardiovascular: Negative.  Negative for chest pain, palpitations and leg swelling.   Gastrointestinal: Negative.  Negative for abdominal pain, nausea and vomiting.   Genitourinary: Negative.  Negative for dysuria and urgency.   Musculoskeletal: Negative.  Negative for myalgias.   Skin: Negative.  Negative for rash.   Neurological: Negative.  Negative for dizziness, weakness and headaches.   Hematological:  Does not bruise/bleed easily.   Psychiatric/Behavioral: Negative.  The patient is not nervous/anxious.        Vital signs in last 24 hours  Temp:  [36.4 °C (97.5 °F)-37.2 °C (99 °F)] 36.9 °C (98.5 °F)  Pulse:  [62-75] 62  Resp:  [18] 18  BP: (116-162)/(66-82) 127/72  SpO2:  [92 %-97 %] 97 %    Physical Exam  Physical Exam  Constitutional:       Appearance: Normal appearance. He is well-developed and normal weight.   HENT:      Head: Normocephalic and atraumatic.      Mouth/Throat:      Mouth: Mucous membranes are moist.   Eyes:      Extraocular Movements: Extraocular movements intact.      Conjunctiva/sclera: Conjunctivae normal.   Cardiovascular:      Rate and Rhythm: Normal rate and regular rhythm.      Pulses: Normal pulses.      Heart sounds: Normal heart sounds.  "  Pulmonary:      Effort: Pulmonary effort is normal.      Breath sounds: Normal breath sounds.   Abdominal:      General: Bowel sounds are normal.      Palpations: Abdomen is soft.   Musculoskeletal:         General: Normal range of motion.      Cervical back: Normal range of motion and neck supple.   Skin:     General: Skin is warm and dry.   Neurological:      General: No focal deficit present.      Mental Status: He is alert and oriented to person, place, and time. Mental status is at baseline.   Psychiatric:         Mood and Affect: Mood normal.         Behavior: Behavior normal.         Thought Content: Thought content normal.         Judgment: Judgment normal.         Lab Review  Lab Results   Component Value Date/Time    WBC 8.1 07/19/2025 12:05 AM    RBC 4.26 (L) 07/19/2025 12:05 AM    HEMOGLOBIN 13.6 (L) 07/19/2025 12:05 AM    HEMATOCRIT 39.0 (L) 07/19/2025 12:05 AM    MCV 91.5 07/19/2025 12:05 AM    MCH 31.9 07/19/2025 12:05 AM    MCHC 34.9 07/19/2025 12:05 AM    MPV 9.3 07/19/2025 12:05 AM      Lab Results   Component Value Date/Time    SODIUM 135 07/19/2025 12:05 AM    POTASSIUM 4.0 07/19/2025 12:05 AM    CHLORIDE 102 07/19/2025 12:05 AM    CO2 23 07/19/2025 12:05 AM    GLUCOSE 98 07/19/2025 12:05 AM    BUN 16 07/19/2025 12:05 AM    CREATININE 0.86 07/19/2025 12:05 AM      Lab Results   Component Value Date/Time    ASTSGOT 22 07/18/2025 11:25 AM    ALTSGPT 15 07/18/2025 11:25 AM     Lab Results   Component Value Date/Time    CHOLSTRLTOT 155 07/19/2025 12:05 AM    LDL 86 07/19/2025 12:05 AM    HDL 59 07/19/2025 12:05 AM    TRIGLYCERIDE 50 07/19/2025 12:05 AM    TROPONINT 40 (H) 07/19/2025 06:28 AM       No results for input(s): \"NTPROBNP\" in the last 72 hours.    Cardiac Imaging and Procedures Review  CARDIAC STUDIES/PROCEDURES:    ECHOCARDIOGRAM CONCLUSIONS (07/19/25)  No prior study is available for comparison.   Mildly reduced left ventricular systolic function.  The ejection fraction is measured to " be 46% by Aldridge's biplane.  Hypokinesis of the apical septum with dyskinesis of the apex.  Grade I diastolic dysfunction.  Normal right ventricular systolic function.  No significant valvular abnormalities.  (study result reviewed)      EKG performed on (07/18/25) was reviewed: EKG personally interpreted shows sinus rhythm with right bundle branch block.     MYOCARDIAL PERFUSION STUDY CONCLUSIONS (=7/19/25)  NUCLEAR IMAGING INTERPRETATION  1.  No evidence of acute myocardial ischemia.  2.  Large fixed myocardial perfusion defect in the cardiac apex and extending into the distal aspects of the anterior, septal and inferior walls.  3.  LVEF is 47%.  4.  Decreased LV wall thickening and motion of the cardiac apex and septal   wall.  ECG INTERPRETATION  Negative stress ECG for ischemia.  (study result reviewed)    Assessment/Plan  Elevated troponin level, abnormal myocardial perfusion imaging study,  cardiomyopathy, history of dyslipidemia: He underwent an echocardiogram which revealed mildly reduced left ventricular systolic function and a myocardial perfusion imaging study which was abnormal as described. We will perform cardiac catheterization. He understands the risks and benefits and agrees with plan.    Thank you for allowing me to participate in the care of this patient.  I will continue to follow this patient    Please contact me with any questions.    Joe Mcdonald M.D.   Cardiologist, [facility]  [phone]

## 2025-07-20 NOTE — DISCHARGE INSTR - DIET
Heart-Healthy Nutrition Therapy    A heart-healthy diet is recommended to reduce your unhealthy blood cholesterol levels, manage high blood pressure, and lower your risk for heart disease.    To follow a heart-healthy diet,    Eat a balanced diet with whole grains, fruits and vegetables, and lean protein sources.  Achieve and maintain a healthy weight.  Choose heart-healthy unsaturated fats. Limit saturated fats, trans fats, and cholesterol intake. Eat more plant-based or vegetarian meals using beans and soy foods for protein.  Eat whole, unprocessed foods to limit the amount of sodium (salt) you eat.  Limit refined carbohydrates especially sugar, sweets and sugar-sweetened beverages.  If you drink alcohol, do so in moderation: one serving per day (women) and two servings per day (men).  One serving is equivalent to 12 ounces beer, 5 ounces wine, or 1.5 ounces distilled spirits    Tips for Choosing Heart-Healthy Fats    Choose lean protein and low-fat dairy foods to reduce saturated fat intake.    Saturated fat is usually found in animal-based protein and is associated with certain health risks. Saturated fat is the biggest contributor to raised low-density lipoprotein (LDL) cholesterol levels in the diet. Research shows that limiting saturated fat lowers unhealthy cholesterol levels. Eat no more than 5-6% of your total calories each day from saturated fat. Ask your registered dietitian nutritionist (RDN) to help you determine how much saturated fat is right for you.  There are many foods that do not contain large amounts of saturated fats. Swapping these foods to replace foods high in saturated fats will help you limit the saturated fat you eat and improve your cholesterol levels. You can also try eating more plant-based or vegetarian meals.        Avoid trans fats    Trans fats increase levels of LDL-cholesterol. Hydrogenated fat in processed foods is the main source of trans fats in foods.   Trans fats can be  found in stick margarine, shortening, processed sweets, baked goods, some fried foods, and packaged foods made with hydrogenated oils. Avoid foods with “partially hydrogenated oil” on the ingredient list such as: cookies, pastries, baked goods, biscuits, crackers, microwave popcorn, and frozen dinners.    Choose foods with heart healthy fats    Polyunsaturated and monounsaturated fat are unsaturated fats that may help lower your blood cholesterol level when used in place of saturated fat in your diet.  Ask your RDN about taking a dietary supplement with plant sterols and stanols to help lower your cholesterol level.  Research shows that substituting saturated fats with unsaturated fats is beneficial to cholesterol levels. Try these easy swaps:      Limit the amount of cholesterol you eat to less than 200 milligrams per day.    Cholesterol is a substance carried through the bloodstream via lipoproteins, which are known as “transporters” of fat. Some body functions need cholesterol to work properly, but too much cholesterol in the bloodstream can damage arteries and build up blood vessel linings (which can lead to heart attack and stroke). You should eat less than 200 milligrams cholesterol per day.  People respond differently to eating cholesterol. There is no test available right now that can figure out which people will respond more to dietary cholesterol and which will respond less. For individuals with high intake of dietary cholesterol, different types of increase (none, small, moderate, large) in LDL-cholesterol levels are all possible.    Food sources of cholesterol include egg yolks and organ meats such as liver, gizzards.  Limit egg yolks to two to four per week and avoid organ meats like liver and gizzards to control cholesterol intake.    Tips for Choosing Heart-Healthy Carbohydrates    Consume foods rich in viscous (soluble) fiber    Viscous, or soluble, fiber is found in the walls of plant cells. Viscous  fiber is found only in plant-based foods--animal-based foods like meat or dairy products do not contain fiber. In the stomach, viscous fibers absorb water and swell to form a thick, jelly-like mass. This helps to lower your unhealthy cholesterol  Rich sources of viscous fiber include asparagus, [place] sprouts, sweet potatoes, turnips, apricots, mangoes, oranges, legumes, barley, oats, and oat bran.  Eat at least 5 to 10 grams of viscous fiber each day. As you increase your fiber intake gradually, also increase the amount of water you drink. This will help prevent constipation.  If you have difficulty achieving this goal, ask your RDN about fiber laxatives. Choose fiber supplements made with viscous fibers such as psyllium seed husks or methylcellulose to help lower unhealthy cholesterol.    Limit refined carbohydrates    There are three types of carbohydrates: starches, sugar, and fiber. Some carbohydrates occur naturally in food, like the starches in rice or corn or the sugars in fruits and milk. Refined carbohydrates--foods with high amounts of simple sugars--can raise triglyceride levels. High triglyceride levels are associated with coronary heart disease.  Some examples of refined carbohydrate foods are table sugar, sweets, and beverages sweetened with added sugar.    Tips for Reducing Sodium (Salt)  Although sodium is important for your body to function, too much sodium can be harmful for people with high blood pressure.  As sodium and fluid buildup in your tissues and bloodstream, your blood pressure increases. High blood pressure may cause damage to other organs and increase your risk for a stroke.    Keep your salt intake to 2300 milligrams or less per day. Even if you take a pill for blood pressure or a water pill (diuretic) to remove fluid, it is still important to have less salt in your diet. Ask your RDN what amount of sodium is right for you.    Avoid processed foods.  Eat more fresh foods.  Fresh  "fruits and vegetables are naturally low in sodium, as well as frozen vegetables and fruits that have no added juices or sauces.  Fresh meats are lower in sodium than processed meats, such as coffey, sausage, and hotdogs.  Read the nutrition label or ask your  to help you find a fresh meat that is low in sodium.  Eat less salt--at the table and when cooking.  A single teaspoon of table salt has 2,300 mg of sodium.  Leave the salt out of recipes for pasta, casseroles, and soups.  Ask your RDN how to cook your favorite recipes without sodium  Be a smart .  Look for food packages that say “salt-free” or “sodium-free.” These items contain less than 5 milligrams of sodium per serving.  “Very low-sodium” products contain less than 35 milligrams of sodium per serving.  “Low-sodium” products contain less than 140 milligrams of sodium per serving.   Beware of “reduced salt” or \"reduced sodium\" products.  These items may still be high in sodium. Check the nutrition label.   Add flavors to your food without adding sodium.  Try lemon juice, lime juice, fruit juice or vinegar.    Dry or fresh herbs add flavor. Try basil, bay leaf, dill, rosemary, parsley, manjula, dry mustard, nutmeg, thyme, and paprika.  Pepper, red pepper flakes, and cayenne pepper can add spice to your meals without adding sodium. Hot sauce contains sodium, but if you use just a drop or two, it will not add up to much.  Buy a sodium-free seasoning blend or make your own at home.     Additional Lifestyle Tips    Achieve and maintain a healthy weight.  Talk with your RDN or your doctor about what is a healthy weight for you.  Set goals to reach and maintain that weight.   To lose weight, reduce your calorie intake along with increasing your physical activity. A weight loss of 10 to 15 pounds could reduce LDL-cholesterol by 5 milligrams per deciliter.  Participate in physical activity.    Talk with your health care team to find out what types of " physical activity are best for you. Set a plan to get about 30 minutes of exercise on most days.          Nutrition Counseling  Our expert team offers:   Medical Nutrition Therapy for Chronic Conditions   Weight Management   Diabetes Education and Management   Wellness Services   Body Composition Measurements   Gastrointestinal Health    [facility] are located at:  [address], [place] [address]  For more information and to schedule a consultation, please call [phone].  A physician referral may be required by your insurance for coverage.

## 2025-07-20 NOTE — CONSULTS
Interventional cardiology consultation requested by  for cardiac catheterization and possible percutaneous coronary intervention     CC: Non-ST elevation myocardial infarction        HPI:  74 y.o. year old patient with epigastric pain, diaphoresis, sudden onset while he was working out in the sun, he briefly became confused, dizzy.  Feels well today.  Troponin is elevated concerning for non-ST elevation myocardial infarction     Medications / Drug list prior to admission:  [Medications Ordered Prior to Encounter]    [Medications Ordered Prior to Encounter]  No current facility-administered medications on file prior to encounter.             Current Outpatient Medications on File Prior to Encounter   Medication Sig Dispense Refill    Cyanocobalamin (B-12 PO) Take 1 Tablet by mouth every day.        omeprazole (PRILOSEC) 20 MG delayed-release capsule Take 20 mg by mouth every day.        atorvastatin (LIPITOR) 10 MG Tab Take 10 mg by mouth every day.        Multiple Vitamins-Minerals (ZINC PO) Take 1 Capsule by mouth every day.        Flaxseed, Linseed, (FLAX SEED OIL PO) Take 1 Tablet by mouth every day.        Multiple Vitamins-Minerals (CENTRUM SILVER 50+MEN PO) Take 1 Tablet by mouth every day.        NON SPECIFIED Take 1 Tablet by mouth every day. Emergency vit C            Current list of administered Medications:  [Current Medications]    [Current Medications]     Current Facility-Administered Medications:     omeprazole (PriLOSEC) capsule 20 mg, 20 mg, Oral, DAILY, Bairon Renae P.A.-C., 20 mg at 07/20/25 0501    NS infusion, , Intravenous, Continuous, Mumtaz Kaplan, A.P.N., Last Rate: 50 mL/hr at 07/20/25 0841, New Bag at 07/20/25 0841    lidocaine (Xylocaine) 1 % injection 0.5 mL, 0.5 mL, Intradermal, Once PRN, Mumtaz Kaplan, A.P.N.    atorvastatin (Lipitor) tablet 40 mg, 40 mg, Oral, Q EVENING, Mumtaz Kaplan, A.P.N.    aspirin EC tablet 81 mg, 81 mg, Oral, DAILY, Ani Craven M.D.,  "81 mg at 07/20/25 0501    aminophylline injection 100 mg, 100 mg, Intravenous, Once PRN, JAMES WisemanPSamanthaNSamantha    enoxaparin (Lovenox) inj 40 mg, 40 mg, Subcutaneous, DAILY AT 1800, Ani Craven M.D., 40 mg at 07/19/25 1701    acetaminophen (Tylenol) tablet 650 mg, 650 mg, Oral, Q6HRS PRN, Ani Craven M.D.    labetalol (Normodyne/Trandate) injection 10 mg, 10 mg, Intravenous, Q4HRS PRN, Ani Craven M.D.    calcium carbonate (Tums) chewable tab 500 mg, 500 mg, Oral, TID PRN, LUCIANA GreenwoodRDERRELL, 500 mg at 07/19/25 1910     [Past Medical History]    [Past Medical History]       Diagnosis Date    Anesthesia       combative under anesthesia \"I was swinging\"     Arrhythmia       history of - asymptomatic    Cataract       iol bilateral    Heart burn      High cholesterol      Urinary incontinence          [Past Surgical History]    [Past Surgical History]        Procedure Laterality Date    MI ENDOSCOPIC US EXAM, ESOPH   8/14/2020     Procedure: EGD, WITH ENDOSCOPIC US - UPPER LINEAR;  Surgeon: Jesus Aponte M.D.;  Location: [facility];  Service: Gastroenterology    MI ERCP,DIAGNOSTIC   8/14/2020     Procedure: ERCP (ENDOSCOPIC RETROGRADE CHOLANGIOPANCREATOGRAPHY) - WITH SPHINCTEROTOMY WITH STONE REMOVAL POSSIBLE STENT PLACEMENT(((not done)));  Surgeon: Jesus Aponte M.D.;  Location: [facility];  Service: Gastroenterology    GASTROSCOPY-ENDO   8/14/2020     Procedure: GASTROSCOPY;  Surgeon: Jesus Aponte M.D.;  Location: [facility];  Service: Gastroenterology    CATARACT EXTRACTION WITH IOL   2019    SHOULDER ARTHROSCOPY W/ ROTATOR CUFF REPAIR Left 11/2018        Family History   No family history on file.     Patient family history was personally reviewed, no pertinent family history to current presentation     [Social History]    [Social History]        Tobacco Use    Smoking status: Never    Smokeless tobacco: Never   Vaping Use    Vaping status: Never " Used   Substance Use Topics    Alcohol use: Yes       Comment: 6-8/day    Drug use: Never        ALLERGIES:  [Allergies]    [Allergies]  No Known Allergies     Review of systems:  A complete review of symptoms was reviewed with patient. This is reviewed in H&P and PMH. ALL OTHERS reviewed and negative     Physical exam:  Patient Vitals for the past 24 hrs:    BP Temp Temp src Pulse Resp SpO2 Weight   07/20/25 0700 123/72 36.1 °C (97 °F) Temporal 66 16 96 % --   07/20/25 0349 127/72 36.9 °C (98.5 °F) Temporal 62 18 97 % 87.9 kg (193 lb 12.6 oz)   07/19/25 2344 (!) 162/82 36.6 °C (97.9 °F) Temporal 66 18 96 % --   07/19/25 1934 120/66 37.2 °C (99 °F) Temporal 67 18 94 % --   07/19/25 1606 123/66 36.4 °C (97.5 °F) Temporal 71 18 92 % --   07/19/25 1253 116/67 36.5 °C (97.7 °F) Temporal 75 18 94 % --      General: No acute distress.   EYES: no jaundice  HEENT: OP clear   Neck:  No JVD.   CVS: Regular rate, rhythm  Resp: Normal respiratory effort, CTAB. No wheezing or crackles/rhonchi.  Abdomen: Soft, ND,  Skin: Grossly nothing acute no obvious rashes  Neurological: Alert, Moves all extremities  Extremities:   [  no ] edema. No cyanosis.         Data:  Laboratory studies personally reviewed by me:  Recent Results         Recent Results (from the past 24 hours)   CBC without Differential     Collection Time: 07/20/25  7:33 AM   Result Value Ref Range     WBC 6.8 4.8 - 10.8 K/uL     RBC 4.72 4.70 - 6.10 M/uL     Hemoglobin 14.7 14.0 - 18.0 g/dL     Hematocrit 44.4 42.0 - 52.0 %     MCV 94.1 81.4 - 97.8 fL     MCH 31.1 27.0 - 33.0 pg     MCHC 33.1 32.3 - 36.5 g/dL     RDW 45.6 35.9 - 50.0 fL     Platelet Count 217 164 - 446 K/uL     MPV 9.2 9.0 - 12.9 fL   POCT activated clotting time device results     Collection Time: 07/20/25 11:55 AM   Result Value Ref Range     Istat Activated Clotting Time 205 74 - 137   POCT activated clotting time device results     Collection Time: 07/20/25 12:13 PM   Result Value Ref Range      Istat Activated Clotting Time 268 74 - 137   POCT activated clotting time device results     Collection Time: 07/20/25 12:33 PM   Result Value Ref Range     Istat Activated Clotting Time 245 74 - 137            Imaging:  NM-CARDIAC STRESS TEST   Final Result       EC-ECHOCARDIOGRAM COMPLETE W/ CONT   Final Result       CL-LEFT HEART CATHETERIZATION WITH POSSIBLE INTERVENTION    (Results Pending)            EKG tracings personally reviewed by me sinus rhythm, right bundle branch block, old inferior infarct, age indeterminate anterior infarct.     Echocardiogram images personally reviewed by me show ejection fraction 45% with apical hypokinesis     All pertinent features of laboratory and imaging reviewed including primary images where applicable        Principal Problem:    NSTEMI (non-ST elevated myocardial infarction) (HCC) (POA: Yes)  Active Problems:    Other hyperlipidemia (POA: Unknown)    GERD (gastroesophageal reflux disease) (POA: Unknown)    HFrEF (heart failure with reduced ejection fraction) (HCC) (POA: Unknown)  Resolved Problems:    * No resolved hospital problems. *        Assessment / Plan:  Given the patient's presentation, abnormal EKG, abnormal troponin, wall motion abnormalities on the echocardiogram, there is a high likelihood that the patient's presentation is due to obstructive coronary artery disease and the patient would likely benefit from percutaneous coronary intervention if technically feasible.  Interventional Cardiology consultation was requested by  to assess the patient's candidacy for PCI and to provide complete informed consent for a possible intervention, which is a procedure that is not performed by General Cardiology and for which General Cardiology cannot provide complete informed consent.     I specifically discussed with the patient that the risk of a life-altering or life-threatening complication such as major bleeding requiring additional intervention, disabling  stroke, major heart attack, need for emergency heart surgery, and/or death was approximately 1-2% with a routine PCI procedure. If there was a need for advanced techniques such as rotational atherectomy, laser atherectomy, or intracoronary lithotripsy, the risk of major complications increases.   We discussed that the risk of kidney injury requiring temporary or permanent dialysis is very low in patients with normal renal function, but ranges from 1-5% in patients with abnormal renal function.  The risk of minor complications such as significant pain, infection, minor heart attack, or minor bleeding not requiring additional intervention can be as high as 5-10%.     We also discussed that a coronary artery stent is a metal scaffold that is permanently implanted in a blood vessel of the heart and would require several months of dual antiplatelet therapy in addition to lifelong therapy with at least one antiplatelet agent and that noncompliance with antiplatelet therapy could result in a blood clot forming within the stent; potentially resulting in a major or life-threatening heart attack.  We further discussed that, with contemporary stents, the risk of stent failure is approximately 1-2%/year.  The patient confirmed their understanding of the importance of adherence with antiplatelet therapy and denied any recent serious bleeding episodes that would be a clear contraindication to initiating antiplatelet therapy.  The patient also denied any upcoming urgent surgeries that could not be postponed if they were started on dual antiplatelet therapy.     I, performing physician independently assessed the patient, reviewed all the pertinent data and feel that they are an appropriate candidate for cardiac catheterization with PCI.  Further recommendations will be pending findings at time of cardiac catheterization.              I personally discussed his case with  Dr Ani Craven M.D.     No future appointments.     It  is my pleasure to participate in the care of Mr. Carr.  Please do not hesitate to contact me with questions or concerns.     Chriss Wetzel M.D.     7/20/2025

## 2025-07-20 NOTE — PROGRESS NOTES
Bedside shift report received from outgoing RN and pt care assumed. Pt is AAOx4, VSS, on room air satting 94, c/o 0/10 pain. Pt updated on plan of care, no questions or concerns at this time.  Tele Box on, rate and rhythm verified and being monitored.  Bed in lowest position, brakes on, call light and pt belongings within reach. Fall precautions maintained. Hourly rounding initiated. Care ongoing.

## 2025-07-20 NOTE — CARE PLAN
The patient is Stable - Low risk of patient condition declining or worsening    Shift Goals  Clinical Goals: heart cath  Patient Goals: complete testing  Family Goals: n/a    Progress made toward(s) clinical / shift goals:    Problem: Safety  Goal: Will remain free from injury  Outcome: Progressing     Problem: Fluid Volume:  Goal: Will maintain balanced intake and output  Outcome: Progressing     Problem: Mobility  Goal: Risk for activity intolerance will decrease  Outcome: Progressing       Patient is not progressing towards the following goals:

## 2025-07-20 NOTE — PROGRESS NOTES
Monitor summary: Per Monitor Room    sinus rhythm rate 62-68  Rare PVC Ectopy  0.16/0.13/0.40

## 2025-07-20 NOTE — PROGRESS NOTES
Hospital Medicine Daily Progress Note    Date of Service  7/20/2025    Chief Complaint  Tommy Carr is a 74 y.o. male admitted 7/18/2025 with diaphoresis and near syncope    Hospital Course  Tommy Carr is a 74-year-old male with PMHx HDL and GERD.  Admitted 7/18 for presyncope and diaphoresis.    Prior history: Patient was working out in the heat yesterday.  He came into his kitchen where he began to feel lightheaded and confused.  This was immediately followed by severe diaphoresis and epigastric pain.  He was brought to the emergency room.    At OSH-patient was noted to have rising troponins.  EKG without ST depression or elevation consistent with ischemia.  Cardiology was consulted from OSH and recommended [facility] for formal consultation.    Interval Problem Update  7/19: Vitals stable overnight.  SBP ranging 115 through 131.  Hb stable at 13.6.  Troponin trend 41, 42, 43.  Cardiology consulted yesterday.  Recommending echocardiogram and stress test.  Both are pending.  Discontinue heparin drip.    7/20: Vitals notable for SBP ranging 123 through 162.  Stress test showing large fixed myocardial perfusion defect in apex and extending to distal anterior, septal and inferior walls.  Mild reduction in EF.  Discussed with Dr. Mcdonald, cardiology.  Planning for left heart cath today.  N.p.o. now.    I have discussed this patient's plan of care and discharge plan at IDT rounds today with Case Management, Nursing, Nursing leadership, and other members of the IDT team.    Consultants/Specialty  cardiology    Code Status  Full Code    Disposition  The patient is not medically cleared for discharge to home or a post-acute facility.      I have placed the appropriate orders for post-discharge needs.    Review of Systems  Review of Systems   Constitutional:  Negative for fever and malaise/fatigue.   Respiratory:  Negative for shortness of breath.    Cardiovascular:  Negative for chest pain and leg swelling.    Gastrointestinal:  Negative for abdominal pain, nausea and vomiting.        Physical Exam  Temp:  [36.1 °C (97 °F)-37.2 °C (99 °F)] 36.1 °C (97 °F)  Pulse:  [62-75] 66  Resp:  [16-18] 16  BP: (116-162)/(66-82) 123/72  SpO2:  [92 %-97 %] 96 %    Physical Exam  Vitals and nursing note reviewed.   Constitutional:       General: He is not in acute distress.     Appearance: Normal appearance. He is not ill-appearing.   Cardiovascular:      Rate and Rhythm: Normal rate and regular rhythm.      Heart sounds: Murmur heard.   Pulmonary:      Effort: Pulmonary effort is normal. No respiratory distress.      Breath sounds: Normal breath sounds. No wheezing.   Skin:     General: Skin is warm and dry.   Neurological:      Mental Status: He is alert and oriented to person, place, and time. Mental status is at baseline.   Psychiatric:         Mood and Affect: Mood normal.         Behavior: Behavior normal.         Fluids  No intake or output data in the 24 hours ending 07/20/25 0830       Laboratory  Recent Labs     07/18/25  1125 07/19/25  0005 07/20/25  0733   WBC 8.6 8.1 6.8   RBC 4.35* 4.26* 4.72   HEMOGLOBIN 13.6* 13.6* 14.7   HEMATOCRIT 40.1* 39.0* 44.4   MCV 92.2 91.5 94.1   MCH 31.3 31.9 31.1   MCHC 33.9 34.9 33.1   RDW 44.3 45.0 45.6   PLATELETCT 201 196 217   MPV 8.8* 9.3 9.2     Recent Labs     07/18/25  1125 07/19/25  0005   SODIUM 137 135   POTASSIUM 4.5 4.0   CHLORIDE 104 102   CO2 23 23   GLUCOSE 137* 98   BUN 11 16   CREATININE 0.79 0.86   CALCIUM 8.9 8.9     Recent Labs     07/18/25  1125   APTT 27.9   INR 1.03         Recent Labs     07/19/25  0005   TRIGLYCERIDE 50   HDL 59   LDL 86       Imaging  NM-CARDIAC STRESS TEST   Final Result      EC-ECHOCARDIOGRAM COMPLETE W/ CONT   Final Result      CL-LEFT HEART CATHETERIZATION WITH POSSIBLE INTERVENTION    (Results Pending)        Assessment/Plan  * NSTEMI (non-ST elevated myocardial infarction) (HCC)- (present on admission)  Assessment & Plan  Presenting with  rising troponins at OSH  No chest pain; diaphoresis and epigastric pain noted  EKG without ST elevation or depression consistent with ischemia  Troponin here 41, 42, 43  Stress test:  large fixed myocardial perfusion defect in apex and extending to distal anterior, septal and inferior walls.  Echo: EF 46%.  Mildly reduced left ventricular systolic function.  Hypokinesis of apical septum with dyskinesis at the apex.  - N.p.o. for left heart cath today  - Cardiology consulted and following  - Continuous telemetry monitoring; monitoring for arrhythmia in the setting of ACS  - Discontinue heparin infusion    HFrEF (heart failure with reduced ejection fraction) ([facility])  Assessment & Plan  New diagnosis  Likely secondary to ischemic cardiomyopathy  - Left heart plan for today  - Cardiology following  - Initiate GDMT after Cath Lab and per cardiology recommendations    GERD (gastroesophageal reflux disease)  Assessment & Plan  Continue omeprazole    Other hyperlipidemia  Assessment & Plan  Continue home atorvastatin         VTE prophylaxis:    enoxaparin ppx      I have performed a physical exam and reviewed and updated ROS and Plan today (7/20/2025). In review of yesterday's note (7/19/2025), there are no changes except as documented above.

## 2025-07-20 NOTE — CARE PLAN
The patient is Stable - Low risk of patient condition declining or worsening    Shift Goals  Clinical Goals: vss, safety, monitor cardiac status  Patient Goals: sleep  Family Goals: lucía    Progress made toward(s) clinical / shift goals:    Problem: Knowledge Deficit - Standard  Goal: Patient and family/care givers will demonstrate understanding of plan of care, disease process/condition, diagnostic tests and medications  Outcome: Progressing     Problem: Safety  Goal: Will remain free from injury  Outcome: Progressing  Goal: Will remain free from falls  Outcome: Progressing     Problem: Pain Management  Goal: Pain level will decrease to patient's comfort goal  Outcome: Progressing     Problem: Fluid Volume:  Goal: Will maintain balanced intake and output  Outcome: Progressing     Problem: Skin Integrity  Goal: Risk for impaired skin integrity will decrease  Outcome: Progressing     Problem: Mobility  Goal: Risk for activity intolerance will decrease  Outcome: Progressing       Patient is not progressing towards the following goals:

## 2025-07-20 NOTE — PROGRESS NOTES
Assumed care of patient, bedside report completed. Updated pt on plan of care, call light in reach, bed alarm in place & in low/locked position, VSS. Pt made NPO for L heart cath today, informed pt, verbalizes understanding.

## 2025-07-20 NOTE — PROCEDURES
Interventional cardiology procedure report    Procedures performed  Coronary angiogram  Left heart catheterization  IVUS guided percutaneous coronary intervention of proximal left anterior descending artery, mid left anterior descending artery  Angioplasty of ramus intermedius branch    Indication for procedure  Non-ST elevation myocardial infarction    Findings  Left main luminal irregularities  Left anterior descending artery subtotally occluded 99% in the proximal portion with JA I flow distally, distal LAD fills through collaterals.  Ramus intermedius medium sized vessel with 80% stenosis in the proximal portion  Circumflex artery luminal irregularities without significant disease  Right coronary artery has 60% stenosis in the proximal portion, appears nonobstructive.  LVEDP 7 mmHg      Procedure details  After informed consent patient was brought to cardiac catheterization laboratory, sterilely prepped, draped.  Timeout was performed.  Right radial artery cannulated using 6 Hebrew slender sheath, coronary angiograms performed using JL 3.5, JR4 diagnostic catheters, JR4 diagnostic catheter used to cross the aortic valve, left ventricular end-diastolic pressure was measured.  EBU 3.5 guide catheter was used to engage left main, run-through wire was used to cross the lesion in the left anterior descending artery, lesion was serially dilated using 1.2 mm  balloon, 2 mm, 3 mm balloons.  Then IVUS was performed showed average vessel diameter 3.5 mm distally, 4 mm proximally, severely calcified left anterior descending artery.  A 3.5 x 24 mm Synergy Megatron drug-eluting stent was placed in the proximal LAD, postdilated to 4 mm with 4.0 x 20 mm NC balloon in the proximal portion.  Good result with 0% stenosis, JA-3 flow distally.  Then mid to distal LAD treated with 3.0 x 8 mm Synergy drug-eluting stent with excellent result, 0% stenosis.  Rest of the LAD has diffuse moderate disease 40 to 50% stenosis but  excellent flow restored, nonflow limiting stenosis.  Run-through wire was removed from LAD, crossed the lesion and ramus intermedius, placed distally, ramus intermedius branch treated with angioplasty using 2.0 x 20 mm balloon at 10 nico pressures with good result, residual stenosis less than 30%, JA-3 flow.  This is a smaller vessel, stent was not placed.  Patient tolerated the procedure well.  No immediate complications.  Guide catheter, guidewire was removed, radial sheath was removed, vas band was applied    Contrast 80 cc  Estimated blood loss less than 20 cc    I supervised moderate sedation or a trained independent nursing staff, sedation start time 1135, sedation end time 1230.

## 2025-07-20 NOTE — PROGRESS NOTES
TR band removed completely with no oozing noted. Slight flattened bruising still present to R forearm area and R groin area, PA aware.

## 2025-07-20 NOTE — PROGRESS NOTES
Upon removing 3cc of air from R radial TR band, pt developed soft hematoma above the radial site. Manual pressure applied. No oozing noted.   Alerted Mumtaz, came to bedside. VSS  Charge RN aware

## 2025-07-20 NOTE — PROGRESS NOTES
Pt back to room, R radial site w/ TR band in place, pulse palpable, site soft and CDI w/ no hematoma. R femoral site w/ gauze and tegaderm in place, site soft and CDI w/ no hematoma. R pedal pulse palpable.  Pt alert, follows commands, vitals stable.

## 2025-07-21 ENCOUNTER — PHARMACY VISIT (OUTPATIENT)
Dept: PHARMACY | Facility: MEDICAL CENTER | Age: 75
End: 2025-07-21
Payer: COMMERCIAL

## 2025-07-21 VITALS
HEART RATE: 75 BPM | TEMPERATURE: 98.2 F | HEIGHT: 69 IN | BODY MASS INDEX: 28.54 KG/M2 | DIASTOLIC BLOOD PRESSURE: 73 MMHG | RESPIRATION RATE: 18 BRPM | WEIGHT: 192.68 LBS | SYSTOLIC BLOOD PRESSURE: 140 MMHG | OXYGEN SATURATION: 94 %

## 2025-07-21 LAB
ANION GAP SERPL CALC-SCNC: 11 MMOL/L (ref 7–16)
BUN SERPL-MCNC: 15 MG/DL (ref 8–22)
CALCIUM SERPL-MCNC: 8.7 MG/DL (ref 8.5–10.5)
CHLORIDE SERPL-SCNC: 105 MMOL/L (ref 96–112)
CO2 SERPL-SCNC: 22 MMOL/L (ref 20–33)
CREAT SERPL-MCNC: 1 MG/DL (ref 0.5–1.4)
ERYTHROCYTE [DISTWIDTH] IN BLOOD BY AUTOMATED COUNT: 44.9 FL (ref 35.9–50)
GFR SERPLBLD CREATININE-BSD FMLA CKD-EPI: 78 ML/MIN/1.73 M 2
GLUCOSE SERPL-MCNC: 106 MG/DL (ref 65–99)
HCT VFR BLD AUTO: 39.5 % (ref 42–52)
HGB BLD-MCNC: 13.4 G/DL (ref 14–18)
MAGNESIUM SERPL-MCNC: 1.9 MG/DL (ref 1.5–2.5)
MCH RBC QN AUTO: 31.8 PG (ref 27–33)
MCHC RBC AUTO-ENTMCNC: 33.9 G/DL (ref 32.3–36.5)
MCV RBC AUTO: 93.6 FL (ref 81.4–97.8)
PLATELET # BLD AUTO: 186 K/UL (ref 164–446)
PMV BLD AUTO: 9.3 FL (ref 9–12.9)
POTASSIUM SERPL-SCNC: 4.6 MMOL/L (ref 3.6–5.5)
RBC # BLD AUTO: 4.22 M/UL (ref 4.7–6.1)
SODIUM SERPL-SCNC: 138 MMOL/L (ref 135–145)
WBC # BLD AUTO: 7.3 K/UL (ref 4.8–10.8)

## 2025-07-21 PROCEDURE — A9270 NON-COVERED ITEM OR SERVICE: HCPCS | Performed by: STUDENT IN AN ORGANIZED HEALTH CARE EDUCATION/TRAINING PROGRAM

## 2025-07-21 PROCEDURE — A9270 NON-COVERED ITEM OR SERVICE: HCPCS

## 2025-07-21 PROCEDURE — 97535 SELF CARE MNGMENT TRAINING: CPT

## 2025-07-21 PROCEDURE — 700102 HCHG RX REV CODE 250 W/ 637 OVERRIDE(OP): Performed by: STUDENT IN AN ORGANIZED HEALTH CARE EDUCATION/TRAINING PROGRAM

## 2025-07-21 PROCEDURE — 97162 PT EVAL MOD COMPLEX 30 MIN: CPT

## 2025-07-21 PROCEDURE — A9270 NON-COVERED ITEM OR SERVICE: HCPCS | Performed by: INTERNAL MEDICINE

## 2025-07-21 PROCEDURE — 83735 ASSAY OF MAGNESIUM: CPT

## 2025-07-21 PROCEDURE — 700102 HCHG RX REV CODE 250 W/ 637 OVERRIDE(OP): Performed by: INTERNAL MEDICINE

## 2025-07-21 PROCEDURE — 700102 HCHG RX REV CODE 250 W/ 637 OVERRIDE(OP)

## 2025-07-21 PROCEDURE — 85027 COMPLETE CBC AUTOMATED: CPT

## 2025-07-21 PROCEDURE — 99239 HOSP IP/OBS DSCHRG MGMT >30: CPT | Performed by: STUDENT IN AN ORGANIZED HEALTH CARE EDUCATION/TRAINING PROGRAM

## 2025-07-21 PROCEDURE — RXMED WILLOW AMBULATORY MEDICATION CHARGE: Performed by: STUDENT IN AN ORGANIZED HEALTH CARE EDUCATION/TRAINING PROGRAM

## 2025-07-21 PROCEDURE — 80048 BASIC METABOLIC PNL TOTAL CA: CPT

## 2025-07-21 RX ORDER — METOPROLOL SUCCINATE 25 MG/1
25 TABLET, EXTENDED RELEASE ORAL
Status: DISCONTINUED | OUTPATIENT
Start: 2025-07-21 | End: 2025-07-21 | Stop reason: HOSPADM

## 2025-07-21 RX ORDER — CLOPIDOGREL BISULFATE 75 MG/1
75 TABLET ORAL DAILY
Qty: 30 TABLET | Refills: 3 | Status: SHIPPED | OUTPATIENT
Start: 2025-07-22

## 2025-07-21 RX ORDER — LOSARTAN POTASSIUM 25 MG/1
12.5 TABLET ORAL
Status: DISCONTINUED | OUTPATIENT
Start: 2025-07-21 | End: 2025-07-21 | Stop reason: HOSPADM

## 2025-07-21 RX ORDER — ASPIRIN 81 MG/1
81 TABLET ORAL DAILY
Qty: 100 TABLET | Refills: 3 | Status: SHIPPED | OUTPATIENT
Start: 2025-07-22

## 2025-07-21 RX ORDER — ATORVASTATIN CALCIUM 40 MG/1
40 TABLET, FILM COATED ORAL EVERY EVENING
Qty: 100 TABLET | Refills: 3 | Status: SHIPPED | OUTPATIENT
Start: 2025-07-21 | End: 2026-08-25

## 2025-07-21 RX ORDER — LOSARTAN POTASSIUM 25 MG/1
12.5 TABLET ORAL DAILY
Qty: 50 TABLET | Refills: 3 | Status: SHIPPED | OUTPATIENT
Start: 2025-07-21 | End: 2026-08-25

## 2025-07-21 RX ORDER — METOPROLOL TARTRATE 25 MG/1
12.5 TABLET, FILM COATED ORAL 2 TIMES DAILY
Qty: 30 TABLET | Refills: 3 | Status: SHIPPED | OUTPATIENT
Start: 2025-07-21

## 2025-07-21 RX ADMIN — CLOPIDOGREL BISULFATE 75 MG: 75 TABLET, FILM COATED ORAL at 05:15

## 2025-07-21 RX ADMIN — OMEPRAZOLE 20 MG: 20 CAPSULE, DELAYED RELEASE ORAL at 05:15

## 2025-07-21 RX ADMIN — ASPIRIN 81 MG: 81 TABLET, COATED ORAL at 05:15

## 2025-07-21 RX ADMIN — LOSARTAN POTASSIUM 12.5 MG: 25 TABLET, FILM COATED ORAL at 09:06

## 2025-07-21 RX ADMIN — METOPROLOL SUCCINATE 25 MG: 25 TABLET, EXTENDED RELEASE ORAL at 09:07

## 2025-07-21 ASSESSMENT — COGNITIVE AND FUNCTIONAL STATUS - GENERAL
MOBILITY SCORE: 20
MOVING TO AND FROM BED TO CHAIR: A LITTLE
WALKING IN HOSPITAL ROOM: A LITTLE
STANDING UP FROM CHAIR USING ARMS: A LITTLE
SUGGESTED CMS G CODE MODIFIER MOBILITY: CJ
CLIMB 3 TO 5 STEPS WITH RAILING: A LITTLE

## 2025-07-21 ASSESSMENT — GAIT ASSESSMENTS
DISTANCE (FEET): 500
GAIT LEVEL OF ASSIST: SUPERVISED

## 2025-07-21 ASSESSMENT — FIBROSIS 4 INDEX: FIB4 SCORE: 2.32

## 2025-07-21 NOTE — THERAPY
Physical Therapy   Initial Evaluation     Patient Name:  Tommy Carr  Age:  74 y.o., Sex:  male  Medical Record #:  [id number]  Today's Date: 7/21/2025          Assessment    74 y.o. male was admitted for NSTEMI, now s/p PCI with angioplasty and ANKIT placement.  PMHx includes arrhythmia and HFrEF.  He lives alone in a mobile home with 3 JONI and was independent for mobility without an AD.  He was able to mobilize x500' with RPE=7, vitals /73, HR 75 at rest, /79, HR 90 /p ambulation.  He denies lightheadedness, dizziness, chest discomfort, or SOB /p ambulation.  Pt was educated on the walking program, RPE scale, and talk test.  He has no further acute or post acute PT needs.  DC PT services.     Plan    Physical Therapy Initial Treatment Plan   Duration: (P) Discharge Needs Only    DC Equipment Recommendations: (P) None  Discharge Recommendations: (P) Anticipate that the patient will have no further physical therapy needs after discharge from the hospital            Objective       07/21/25 0850   Initial Contact Note    Initial Contact Note Order Received and Verified, Physical Therapy Evaluation in Progress with Full Report to Follow.   Vitals   Pulse 75  (semi-Curiel resting, 90 /p ambulation)   Blood Pressure  (!) 140/73  (semi-Curiel resting, 156/79 /p ambulation)   O2 Delivery Device None - Room Air   Pain 0 - 10 Group   Therapist Pain Assessment During Activity  (Pt reportts intermittent L knee pain- chronic, does not give out on him)   Prior Living Situation   Housing / Facility Mobile Home   Steps Into Home 3   Rail Both Rail (Steps into Home)   Bathroom Set up Walk In Shower   Equipment Owned None   Lives with - Patient's Self Care Capacity Alone and Able to Care For Self  (girlfriend will be intermittently helping him out and checking on him)   Prior Level of Functional Mobility   Bed Mobility Independent   Transfer Status Independent   Ambulation Independent   Ambulation Distance community    Assistive Devices Used None   Stairs Independent   Comments drives, doesn't work, 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 and at an [employer] park, has a exercise bike that he uses ~3x/ week   History of Falls   History of Falls No   Date of Last Fall   (Pt reports no falls x1 year)   Cognition    Cognition / Consciousness WDL   Level of Consciousness Alert   Active ROM Upper Body   Active ROM Upper Body  WDL   Strength Upper Body   Upper Body Strength  WDL   Active ROM Lower Body    Active ROM Lower Body  WDL   Strength Lower Body   Lower Body Strength  WDL   Sensation Lower Body   Lower Extremity Sensation   WDL   Balance Assessment   Sitting Balance (Static) Good   Sitting Balance (Dynamic) Good   Standing Balance (Static) Good   Standing Balance (Dynamic) Fair +   Weight Shift Sitting Good   Weight Shift Standing Good   Comments without AD   Bed Mobility    Supine to Sit Independent   Sit to Supine Independent   Scooting Independent   Rolling Independent   Comments bed flat, no rail   Gait Analysis   Gait Level Of Assist Supervised   Assistive Device None   Distance (Feet) 500   # of Times Distance was Traveled 1   Deviation   (decreased L stance time)   # of Stairs Climbed 4  (step thru, R rail)   Level of Assist with Stairs Supervised   Functional Mobility   Sit to Stand Supervised   Bed, Chair, Wheelchair Transfer Supervised   Transfer Method Stand Step   Mobility without AD   Education Group   Education Provided Role of Physical Therapist;Cardiac Precautions   Cardiac Precautions Patient Response Patient;Acceptance;Explanation;Handout;Action Demonstration  (ex-wife present, girlfriend on the phone)   Role of Physical Therapist Patient Response Patient;Acceptance;Explanation;Action Demonstration   Physical Therapy Initial Treatment Plan    Duration Discharge Needs Only   Anticipated Discharge Equipment and Recommendations   DC Equipment Recommendations None   Discharge Recommendations Anticipate that the patient will have no  further physical therapy needs after discharge from the hospital   Interdisciplinary Plan of Care Collaboration   IDT Collaboration with  Nursing   Patient Position at End of Therapy In Bed   Collaboration Comments RN updated   Session Information   Date / Session Number  7/21 -dc needs only

## 2025-07-21 NOTE — PROGRESS NOTES
"Cardiology Follow Up Progress Note    Date of Service  2025    Attending Physician  Ani Craven M.D.    Chief Complaint   Elevated troponin level    HPI  Tommy Carr is a 74 y.o. male admitted 2025 with presyncope and diaphoresis. PMH of HDL and GERD.    Patient was working out in the heat . He came into his kitchen where he began to feel lightheaded and confused. This was immediately followed by severe diaphoresis and epigastric pain. He was brought to the emergency room. \"Dr. Craven's note\".     Interim Events  2025: Patient's status post successful PCI to proximal LAD and mid LAD with angioplasty of the ramus intermedius branch yesterday. Patient has been cardiac complaint free since this time. Has been up out of bed walking around without any cardiac complaints. No acute events overnight.  No chest pain or palpitations. No shortness of breath, dyspnea on exertion, orthopnea or PND. No lower extremity edema. No dizziness or lightheadedness. No syncope or presyncope.    Tele: Sinus rhythm, 60  BP: 110's-140's  Labs: Cr 1.00  M.9  K: 4.6  Trop: 41>42>43>40  LDL: 86      Review of Systems  Negative besides outlined above    Vital signs in last 24 hours  Temp:  [35.8 °C (96.5 °F)-36.8 °C (98.2 °F)] 36.8 °C (98.2 °F)  Pulse:  [59-72] 72  Resp:  [14-18] 18  BP: (111-141)/(54-84) 141/84  SpO2:  [90 %-95 %] 94 %    Physical Exam  Physical Exam  Constitutional:       General: He is not in acute distress.  HENT:      Mouth/Throat:      Mouth: Mucous membranes are moist.   Eyes:      General: No scleral icterus.  Neck:      Comments: No JVD  Cardiovascular:      Rate and Rhythm: Normal rate and regular rhythm.      Pulses: Normal pulses.      Heart sounds: Normal heart sounds.   Pulmonary:      Effort: Pulmonary effort is normal.      Breath sounds: Normal breath sounds.   Abdominal:      General: There is no distension.      Palpations: Abdomen is soft.      Tenderness: There is no " "abdominal tenderness. There is no guarding.   Musculoskeletal:      Right lower leg: No edema.      Left lower leg: No edema.   Skin:     General: Skin is warm.      Capillary Refill: Capillary refill takes less than 2 seconds.      Comments: Right wrist without erythema or hematoma.    Neurological:      Mental Status: He is alert.       Lab Review  Lab Results   Component Value Date/Time    WBC 7.3 2025 12:43 AM    RBC 4.22 (L) 2025 12:43 AM    HEMOGLOBIN 13.4 (L) 2025 12:43 AM    HEMATOCRIT 39.5 (L) 2025 12:43 AM    MCV 93.6 2025 12:43 AM    MCH 31.8 2025 12:43 AM    MCHC 33.9 2025 12:43 AM    MPV 9.3 2025 12:43 AM      Lab Results   Component Value Date/Time    SODIUM 138 2025 12:43 AM    POTASSIUM 4.6 2025 12:43 AM    CHLORIDE 105 2025 12:43 AM    CO2 22 2025 12:43 AM    GLUCOSE 106 (H) 2025 12:43 AM    BUN 15 2025 12:43 AM    CREATININE 1.00 2025 12:43 AM      Lab Results   Component Value Date/Time    ASTSGOT 22 2025 11:25 AM    ALTSGPT 15 2025 11:25 AM     Lab Results   Component Value Date/Time    CHOLSTRLTOT 155 2025 12:05 AM    LDL 86 2025 12:05 AM    HDL 59 2025 12:05 AM    TRIGLYCERIDE 50 2025 12:05 AM    TROPONINT 40 (H) 2025 06:28 AM       No results for input(s): \"NTPROBNP\" in the last 72 hours.    Cardiac Imaging and Procedures Review  [id number]  Sinus bradycardia   Right bundle branch block   Inferior infarct, old   Anteroseptal infarct, age indeterminate   Compared to ECG 2025 09:44:07   Sinus rhythm no longer present   2025  Sinus rhythm   Right bundle branch block   Inferior infarct, old   Anterolateral infarct, age indeterminate     Echocardiogram: 2025  No prior study is available for comparison.   Mildly reduced left ventricular systolic function.  The ejection fraction is measured to be 46% by Aldridge's biplane.  Hypokinesis of the apical " septum with dyskinesis of the apex.  Grade I diastolic dysfunction.  Normal right ventricular systolic function.  No significant valvular abnormalities.    Cardiac Catheterization: 7/20/2025  Left main luminal irregularities  Left anterior descending artery subtotally occluded 99% in the proximal portion with JA I flow distally, distal LAD fills through collaterals.  Ramus intermedius medium sized vessel with 80% stenosis in the proximal portion  Circumflex artery luminal irregularities without significant disease  Right coronary artery has 60% stenosis in the proximal portion, appears nonobstructive.  LVEDP 7 mmHg    Assessment/Plan  Problem List[1]    Recommendations:  -He is status post PCI yesterday to the proximal and mid LAD along with angioplasty of the ramus intermedius. Has 60% residual RCA stenosis. Has been just chest pain complaint free since yesterday. Euvolemic on exam.   -Most recent echocardiogram reveals LVEF of 45% with no significant valvular disease. Consider rechecking echo in 3 to 4 months  -Continue DAPT with aspirin 81 mg and clopidogrel 75 mg for at least 6-12 months  -Continue with atorvastatin 40 mg, most recent LDL 86, goal less than 55  -Will start low-dose losartan 12.5 mg and metoprolol succinate 25 mg. Has some blood pressure room. Plan to closely follow-up as outpatient in 4 to 6 weeks. BMP in 1-2 weeks at outpatient.   -Post-cath precautions discussed. No further questions. Right wrist without erythema or hematoma.  -Meds to beds as he [place] far out of town.  -Request for follow-up with cardiology has been sent, patient request virtual appointment    Please see Dr. Peres's attestation for further details and MDM.     I personally spent a total of 15 minutes which includes face-to-face time and non-face-to-face time spent on preparing to see the patient, reviewing hospital notes and tests, obtaining history from the patient, performing a medically appropriate exam, counseling and  educating the patient, ordering medications/tests/procedures/referrals as clinically indicated, and documenting information in the electronic medical record.    Thank you for allowing me to participate in the care of Tommy Carr .    Stephon Pinto PA-C, Cardiology  [facility] [address]  [address] [address]  Phone: [phone]  Fax: [phone]    PLEASE NOTE: This note was created using voice recognition software. I have made every reasonable attempt to correct obvious errors, but I expect that there are errors of grammar and possibly content that I did not discover before finalizing the note.          [1]   Patient Active Problem List  Diagnosis    NSTEMI (non-ST elevated myocardial infarction) ([facility])    Other hyperlipidemia    GERD (gastroesophageal reflux disease)    HFrEF (heart failure with reduced ejection fraction) ([facility])

## 2025-07-21 NOTE — PROGRESS NOTES
Patient discharged home with follow up instructions. Discharge information and education provided by this RN, all questions answered. Verified PIV removal. Meds to beds delivered. No stored home medications. All belongings returned. Patient escorted out.

## 2025-07-21 NOTE — DISCHARGE INSTRUCTIONS
"Post Angiogram Groin Care Instructions     INSTRUCTIONS  Examine (look and feel) the site of your incision site TODAY so you can recognize changes that should be called to your doctor (see below).  Avoid straining either by lifting or pulling objects for 4-5 days. Avoid lifting over 5 pounds.   For at least 72 hours, if you should sneeze or cough, please hold pressure over your groin area.  If you should begin to have oozing from the catheterization site, please hold firm pressure and call your doctor's office immediately.  If profuse bleeding occurs from the catheterization site, hold firm pressure and call \"911\" immediately for assistance.  Remove bandage after 24 hours.     ACTIVITY  Limit activity as instructed by your doctor.  No driving or very limited driving with frequent stops for one week.   If you must take a long car ride, stop every hour and walk around the car.   Warm showers or baths are permitted after the bandage is removed. Avoid hot showers, baths, hot tubs, and swimming for one week.    PLEASE CALL YOUR DOCTOR IF:  Temperature elevation occurs.  Catheterization site becomes reddened or begins to drain.   Bruising appears to be new or not resolving. The bruise may move down your leg. This is normal.  The small round lump in the groin increases in size.  Any leg numbness, aching, or discomfort (immediately).  Increasing discomfort in the leg at the insertion site.  Chest pains, even if relieved by Nitroglycerin.    MISCELLANEOUS INSTRUCTIONS  Bruising may occur as a result of heart catheterization. Some of the discoloration may travel down the leg, going from blue to green in color.  A small round lump under the catheterization site will remain for up to six weeks.  If any questions arise call your physician's office. The Contact Center is open Monday through Friday 7AM to 5PM and may speak to a nurse at [phone], or toll free at [phone].   You should call 911 if you develop problems " with breathing or chest pain.        I acknowledge receipt and understanding of these Home Care instructions.

## 2025-07-21 NOTE — CARE PLAN
The patient is Stable - Low risk of patient condition declining or worsening    Shift Goals  Clinical Goals: monitor cath sites, VSS  Patient Goals: mobility, plan of care  Family Goals: not present    Progress made toward(s) clinical / shift goals:    Problem: Knowledge Deficit - Standard  Goal: Patient and family/care givers will demonstrate understanding of plan of care, disease process/condition, diagnostic tests and medications  Outcome: Progressing     Problem: Safety  Goal: Will remain free from injury  Outcome: Progressing     Problem: Pain Management  Goal: Pain level will decrease to patient's comfort goal  Outcome: Progressing     Problem: Fluid Volume:  Goal: Will maintain balanced intake and output  Outcome: Progressing     Problem: Skin Integrity  Goal: Risk for impaired skin integrity will decrease  Outcome: Progressing     Problem: Mobility  Goal: Risk for activity intolerance will decrease  Outcome: Progressing

## 2025-07-21 NOTE — DISCHARGE SUMMARY
Discharge Summary    CHIEF COMPLAINT ON ADMISSION  No chief complaint on file.      Reason for Admission  NSTEMI     Admission Date  7/18/2025    CODE STATUS  Full Code    HPI & HOSPITAL COURSE    Tommy Carr is a 74-year-old male with PMHx HDL and GERD.  Admitted 7/18 for presyncope and diaphoresis.    Prior history: Patient was working out in the heat yesterday.  He came into his kitchen where he began to feel lightheaded and confused.  This was immediately followed by severe diaphoresis and epigastric pain.  He was brought to the emergency room.    At OSH-patient was noted to have rising troponins.  EKG without ST depression or elevation consistent with ischemia.  Cardiology was consulted from OSH and recommended [facility] for formal consultation.    Patient underwent stress test which showed a large fixed myocardial perfusion defect in the cardiac apex and extending to distal aspects of anterior, septal and inferior walls.  Echocardiogram showing moderately reduced ejection fraction at 46%.  Hypokinesis of apical septum.    Cardiology recommending left heart catheterization which was completed on 7/20.  PCI to proximal LAD and mid LAD with angioplasty of ramus intermedius branch.  He did not have any post catheterization complications.    Patient was started on gentle GDMT with metoprolol and losartan.  He has low to normal blood pressures.  He will follow-up with cardiology in the next 1 week.  He will continue DAPT therapy for the next 12 months.    Therefore, he is discharged in good and stable condition to home with close outpatient follow-up.    The patient met 2-midnight criteria for an inpatient stay at the time of discharge.    Discharge Date  7/21/2025    FOLLOW UP ITEMS POST DISCHARGE  Follow-up with cardiology-1 week  Follow-up with primary care physician 2 to 3 days    DISCHARGE DIAGNOSES  Principal Problem:    NSTEMI (non-ST elevated myocardial infarction) ([facility]) (POA: Yes)  Active Problems:    Other  hyperlipidemia (POA: Unknown)    GERD (gastroesophageal reflux disease) (POA: Unknown)    HFrEF (heart failure with reduced ejection fraction) ([facility]) (POA: Unknown)  Resolved Problems:    * No resolved hospital problems. *      FOLLOW UP  No future appointments.  [facility]  [address], [address]  [place] [id number] [phone]  Follow up in 1 week(s)        MEDICATIONS ON DISCHARGE     Medication List        START taking these medications        Instructions   Aspirin Low Dose 81 MG EC tablet  Start taking on: July 22, 2025  Generic drug: aspirin   Take 1 Tablet by mouth every day.  Dose: 81 mg     clopidogrel 75 MG Tabs  Start taking on: July 22, 2025  Commonly known as: Plavix   Take 1 Tablet by mouth every day.  Dose: 75 mg     losartan 25 MG Tabs  Commonly known as: Cozaar   Take 0.5 Tablets by mouth every day.  Dose: 12.5 mg     metoprolol tartrate 25 MG Tabs  Commonly known as: Lopressor   Take 0.5 Tablets by mouth 2 times a day.  Dose: 12.5 mg            CHANGE how you take these medications        Instructions   atorvastatin 40 MG Tabs  What changed:   medication strength  how much to take  when to take this  Commonly known as: Lipitor   Take 1 Tablet by mouth every evening.  Dose: 40 mg            CONTINUE taking these medications        Instructions   B-12 PO   Take 1 Tablet by mouth every day.  Dose: 1 Tablet     CENTRUM SILVER 50+MEN PO   Take 1 Tablet by mouth every day.  Dose: 1 Tablet     FLAX SEED OIL PO   Take 1 Tablet by mouth every day.  Dose: 1 Tablet     NON SPECIFIED   Take 1 Tablet by mouth every day. Emergency vit C  Dose: 1 Tablet     omeprazole 20 MG delayed-release capsule  Commonly known as: PriLOSEC   Take 20 mg by mouth every day.  Dose: 20 mg     ZINC PO   Take 1 Capsule by mouth every day.  Dose: 1 Capsule              Allergies  Allergies[1]    DIET  Orders Placed This Encounter   Procedures    Diet Order Diet: Cardiac     Standing Status:   Standing     Number  of Occurrences:   1     Diet::   Cardiac [6]    Discontinue Diet Tray     Standing Status:   Standing     Number of Occurrences:   1       ACTIVITY  As tolerated.  Weight bearing as tolerated    CONSULTATIONS  Cardiology    PROCEDURES  Left heart catheterization 7/20:  Procedures performed  Coronary angiogram  Left heart catheterization  IVUS guided percutaneous coronary intervention of proximal left anterior descending artery, mid left anterior descending artery  Angioplasty of ramus intermedius branch    LABORATORY  Lab Results   Component Value Date    SODIUM 138 07/21/2025    POTASSIUM 4.6 07/21/2025    CHLORIDE 105 07/21/2025    CO2 22 07/21/2025    GLUCOSE 106 (H) 07/21/2025    BUN 15 07/21/2025    CREATININE 1.00 07/21/2025        Lab Results   Component Value Date    WBC 7.3 07/21/2025    HEMOGLOBIN 13.4 (L) 07/21/2025    HEMATOCRIT 39.5 (L) 07/21/2025    PLATELETCT 186 07/21/2025      NM-CARDIAC STRESS TEST   Final Result      EC-ECHOCARDIOGRAM COMPLETE W/ CONT   Final Result      CL-LEFT HEART CATHETERIZATION WITH POSSIBLE INTERVENTION    (Results Pending)         Total time of the discharge process exceeds 43 minutes.         [1] No Known Allergies

## 2025-07-31 ENCOUNTER — TELEPHONE (OUTPATIENT)
Dept: CARDIOLOGY | Facility: MEDICAL CENTER | Age: 75
End: 2025-07-31
Payer: COMMERCIAL

## 2025-07-31 NOTE — TELEPHONE ENCOUNTER
I spoke with patient and confirmed they do not have a prior Cardiologist.      Patient stated that he went to [facility] in [place], [place]. Patient stated that he had labs and an EKG be fore being transferred to [facility].

## 2025-07-31 NOTE — TELEPHONE ENCOUNTER
Faxed records request to [facility] and labs from 7/17/25-7/18/25.  Fax [phone]      Fax confirmation has been received and sent to scan through [employer].

## 2025-08-04 ENCOUNTER — TELEPHONE (OUTPATIENT)
Dept: CARDIOLOGY | Facility: MEDICAL CENTER | Age: 75
End: 2025-08-04

## 2025-08-04 ENCOUNTER — OFFICE VISIT (OUTPATIENT)
Dept: CARDIOLOGY | Facility: MEDICAL CENTER | Age: 75
End: 2025-08-04
Attending: PHYSICIAN ASSISTANT
Payer: MEDICARE

## 2025-08-04 VITALS
WEIGHT: 191 LBS | DIASTOLIC BLOOD PRESSURE: 52 MMHG | HEART RATE: 60 BPM | OXYGEN SATURATION: 100 % | RESPIRATION RATE: 14 BRPM | BODY MASS INDEX: 28.29 KG/M2 | HEIGHT: 69 IN | SYSTOLIC BLOOD PRESSURE: 122 MMHG

## 2025-08-04 DIAGNOSIS — E78.49 OTHER HYPERLIPIDEMIA: ICD-10-CM

## 2025-08-04 DIAGNOSIS — I21.4 NSTEMI (NON-ST ELEVATED MYOCARDIAL INFARCTION) (HCC): Primary | ICD-10-CM

## 2025-08-04 DIAGNOSIS — I50.20 HFREF (HEART FAILURE WITH REDUCED EJECTION FRACTION) (HCC): ICD-10-CM

## 2025-08-04 PROCEDURE — 99214 OFFICE O/P EST MOD 30 MIN: CPT

## 2025-08-04 PROCEDURE — 3074F SYST BP LT 130 MM HG: CPT | Performed by: PHYSICIAN ASSISTANT

## 2025-08-04 PROCEDURE — 99204 OFFICE O/P NEW MOD 45 MIN: CPT | Performed by: PHYSICIAN ASSISTANT

## 2025-08-04 PROCEDURE — 3078F DIAST BP <80 MM HG: CPT | Performed by: PHYSICIAN ASSISTANT

## 2025-08-04 PROCEDURE — 99213 OFFICE O/P EST LOW 20 MIN: CPT | Performed by: PHYSICIAN ASSISTANT

## 2025-08-04 RX ORDER — METOPROLOL SUCCINATE 25 MG/1
25 TABLET, EXTENDED RELEASE ORAL DAILY
Qty: 90 TABLET | Refills: 2 | Status: SHIPPED | OUTPATIENT
Start: 2025-08-04

## 2025-08-04 ASSESSMENT — ENCOUNTER SYMPTOMS
SHORTNESS OF BREATH: 0
PND: 0
ORTHOPNEA: 0
COUGH: 0
LOSS OF CONSCIOUSNESS: 0
PALPITATIONS: 0
DIZZINESS: 0
NAUSEA: 0

## 2025-08-04 ASSESSMENT — FIBROSIS 4 INDEX: FIB4 SCORE: 2.32

## 2025-08-06 ENCOUNTER — TELEPHONE (OUTPATIENT)
Dept: CARDIOLOGY | Facility: MEDICAL CENTER | Age: 75
End: 2025-08-06
Payer: COMMERCIAL

## 2025-08-18 ENCOUNTER — PATIENT MESSAGE (OUTPATIENT)
Dept: CARDIOLOGY | Facility: MEDICAL CENTER | Age: 75
End: 2025-08-18
Payer: COMMERCIAL

## 2025-08-20 ENCOUNTER — PATIENT MESSAGE (OUTPATIENT)
Dept: CARDIOLOGY | Facility: MEDICAL CENTER | Age: 75
End: 2025-08-20
Payer: COMMERCIAL

## 2025-08-26 ENCOUNTER — PATIENT MESSAGE (OUTPATIENT)
Dept: CARDIOLOGY | Facility: MEDICAL CENTER | Age: 75
End: 2025-08-26
Payer: COMMERCIAL

## 2025-08-27 ENCOUNTER — PATIENT MESSAGE (OUTPATIENT)
Dept: CARDIOLOGY | Facility: MEDICAL CENTER | Age: 75
End: 2025-08-27
Payer: COMMERCIAL

## (undated) DEVICE — ELECTRODE DUAL RETURN W/ CORD - (50/PK)

## (undated) DEVICE — TUBE E-T HI-LO CUFF 7.0MM (10EA/PK)

## (undated) DEVICE — ELECTRODE 850 FOAM ADHESIVE - HYDROGEL RADIOTRNSPRNT (50/PK)

## (undated) DEVICE — KIT  I.V. START (100EA/CA)

## (undated) DEVICE — TUBE CONNECTING SUCTION - CLEAR PLASTIC STERILE 72 IN (50EA/CA)

## (undated) DEVICE — KIT ANESTHESIA W/CIRCUIT & 3/LT BAG W/FILTER (20EA/CA)

## (undated) DEVICE — BITE BLOCK ADULT 60FR (100EA/CA)

## (undated) DEVICE — TUBE E-T HI-LO CUFF 7.5MM (10EA/PK)

## (undated) DEVICE — SYRINGE 12 CC LUER TIP - (80/BX) OBSOLETE ITEM

## (undated) DEVICE — SPHINCTEROTOME ENDOSCOPIC JAGTOME RX 44 .035IN 30MM 260CM 4.4FR

## (undated) DEVICE — SYRINGE SAFETY 10 ML 18 GA X 1 1/2 BLUNT LL (100/BX 4BX/CA)

## (undated) DEVICE — TUBING CLEARLINK DUO-VENT - C-FLO (48EA/CA)

## (undated) DEVICE — SYRINGE SAFETY 5 ML 18 GA X 1-1/2 BLUNT LL (100/BX 4BX/CA)

## (undated) DEVICE — CANISTER SUCTION RIGID RED 1500CC (40EA/CA)

## (undated) DEVICE — WATER IRRIGATION STERILE 1000ML (12EA/CA)

## (undated) DEVICE — SPONGE GAUZE NON-STERILE 4X4 - (2000/CA 10PK/CA)

## (undated) DEVICE — SYRINGE DISP. 60 CC LL - (30/BX, 12BX/CA)**WHEN THESE ARE GONE ORDER #500206**

## (undated) DEVICE — TUBE E-T HI-LO CUFF 6.5MM (10EA/BX)

## (undated) DEVICE — CATHETER IV SAFETY 20 GA X 1-1/4 (50/BX)

## (undated) DEVICE — NEPTUNE 4 PORT MANIFOLD - (20/PK)

## (undated) DEVICE — SYRINGE SAFETY 3 ML 18 GA X 1 1/2 BLUNT LL (100/BX 8BX/CA)

## (undated) DEVICE — GLOVE, LITE (PAIR)

## (undated) DEVICE — DRAPE X LONG COILED INSTRUMENT ORGANIZER EUS (20EA/BX)

## (undated) DEVICE — FORCEP RADIAL JAW 4 STANDARD CAPACITY W/NEEDLE 240CM (40EA/BX)

## (undated) DEVICE — TUBE E-T HI-LO CUFF 8.5MM (10EA/PK)

## (undated) DEVICE — BALLOON RETRIEVAL EXTRACTOR PRO RX   9-12MM

## (undated) DEVICE — TUBE E-T HI-LO CUFF 8.0MM (10EA/PK)

## (undated) DEVICE — GOWN SURGEONS LARGE - (32/CA)

## (undated) DEVICE — TUBE SUCTION YANKAUER  1/4 X 6FT (20EA/CA)"

## (undated) DEVICE — BLOCK BITE ENDOSCOPIC 2809 - (100/BX) INTERMEDIATE

## (undated) DEVICE — MASK ANESTHESIA ADULT  - (100/CA)

## (undated) DEVICE — LACTATED RINGERS INJ 1000 ML - (14EA/CA 60CA/PF)

## (undated) DEVICE — SENSOR SPO2 ADULT LNCS ADTX (20/BX) ORDER ITEM #19593

## (undated) DEVICE — KIT CUSTOM PROCEDURE SINGLE FOR ENDO  (15/CA)

## (undated) DEVICE — MASK WITH FACE SHIELD (25/BX 4BX/CA)

## (undated) DEVICE — CATHETER IV SAFETY 22 GA X 1 (50EA/BX)

## (undated) DEVICE — BALLOON RETRIEVAL EXTRACTOR PRO RX   15-18MM